# Patient Record
Sex: FEMALE | Race: WHITE | NOT HISPANIC OR LATINO
[De-identification: names, ages, dates, MRNs, and addresses within clinical notes are randomized per-mention and may not be internally consistent; named-entity substitution may affect disease eponyms.]

---

## 2017-06-20 ENCOUNTER — APPOINTMENT (OUTPATIENT)
Dept: OBGYN | Facility: CLINIC | Age: 57
End: 2017-06-20

## 2017-06-20 ENCOUNTER — OUTPATIENT (OUTPATIENT)
Dept: OUTPATIENT SERVICES | Facility: HOSPITAL | Age: 57
LOS: 1 days | End: 2017-06-20
Payer: MEDICARE

## 2017-06-20 ENCOUNTER — RESULT REVIEW (OUTPATIENT)
Age: 57
End: 2017-06-20

## 2017-06-20 VITALS
RESPIRATION RATE: 19 BRPM | DIASTOLIC BLOOD PRESSURE: 79 MMHG | TEMPERATURE: 99.3 F | SYSTOLIC BLOOD PRESSURE: 127 MMHG | OXYGEN SATURATION: 98 % | HEART RATE: 76 BPM | HEIGHT: 68 IN | BODY MASS INDEX: 44.41 KG/M2 | WEIGHT: 293 LBS

## 2017-06-20 DIAGNOSIS — Z00.00 ENCOUNTER FOR GENERAL ADULT MEDICAL EXAMINATION WITHOUT ABNORMAL FINDINGS: ICD-10-CM

## 2017-06-20 DIAGNOSIS — Z01.419 ENCOUNTER FOR GYNECOLOGICAL EXAMINATION (GENERAL) (ROUTINE) W/OUT ABNORMAL FINDINGS: ICD-10-CM

## 2017-06-20 DIAGNOSIS — Z87.09 PERSONAL HISTORY OF OTHER DISEASES OF THE RESPIRATORY SYSTEM: ICD-10-CM

## 2017-06-20 DIAGNOSIS — Z86.018 PERSONAL HISTORY OF OTHER BENIGN NEOPLASM: ICD-10-CM

## 2017-06-20 DIAGNOSIS — D25.9 LEIOMYOMA OF UTERUS, UNSPECIFIED: ICD-10-CM

## 2017-06-20 PROCEDURE — G0463: CPT

## 2017-06-20 PROCEDURE — 88175 CYTOPATH C/V AUTO FLUID REDO: CPT

## 2017-06-20 PROCEDURE — G0101: CPT

## 2017-06-20 PROCEDURE — 87624 HPV HI-RISK TYP POOLED RSLT: CPT

## 2017-06-20 RX ORDER — HYDROCHLOROTHIAZIDE 25 MG/1
25 TABLET ORAL
Qty: 30 | Refills: 0 | Status: ACTIVE | COMMUNITY
Start: 2017-06-09

## 2017-06-20 RX ORDER — METOPROLOL TARTRATE 25 MG/1
25 TABLET, FILM COATED ORAL
Qty: 120 | Refills: 0 | Status: ACTIVE | COMMUNITY
Start: 2017-06-09

## 2017-06-22 DIAGNOSIS — Z01.419 ENCOUNTER FOR GYNECOLOGICAL EXAMINATION (GENERAL) (ROUTINE) WITHOUT ABNORMAL FINDINGS: ICD-10-CM

## 2017-06-22 DIAGNOSIS — D25.9 LEIOMYOMA OF UTERUS, UNSPECIFIED: ICD-10-CM

## 2017-06-23 LAB
C TRACH RRNA SPEC QL NAA+PROBE: SIGNIFICANT CHANGE UP
N GONORRHOEA RRNA SPEC QL NAA+PROBE: SIGNIFICANT CHANGE UP
SPECIMEN SOURCE: SIGNIFICANT CHANGE UP

## 2017-06-27 ENCOUNTER — APPOINTMENT (OUTPATIENT)
Dept: CARDIOLOGY | Facility: CLINIC | Age: 57
End: 2017-06-27

## 2017-06-27 ENCOUNTER — OUTPATIENT (OUTPATIENT)
Dept: OUTPATIENT SERVICES | Facility: HOSPITAL | Age: 57
LOS: 1 days | End: 2017-06-27
Payer: MEDICARE

## 2017-06-27 VITALS
BODY MASS INDEX: 44.41 KG/M2 | HEIGHT: 68 IN | SYSTOLIC BLOOD PRESSURE: 123 MMHG | RESPIRATION RATE: 18 BRPM | HEART RATE: 78 BPM | DIASTOLIC BLOOD PRESSURE: 85 MMHG | OXYGEN SATURATION: 95 % | WEIGHT: 293 LBS

## 2017-06-27 DIAGNOSIS — Z00.8 ENCOUNTER FOR OTHER GENERAL EXAMINATION: ICD-10-CM

## 2017-06-27 PROCEDURE — 93005 ELECTROCARDIOGRAM TRACING: CPT

## 2017-06-27 PROCEDURE — G0463: CPT | Mod: 25

## 2017-06-28 DIAGNOSIS — R07.9 CHEST PAIN, UNSPECIFIED: ICD-10-CM

## 2017-07-25 ENCOUNTER — OUTPATIENT (OUTPATIENT)
Dept: OUTPATIENT SERVICES | Facility: HOSPITAL | Age: 57
LOS: 1 days | End: 2017-07-25
Payer: MEDICARE

## 2017-07-25 ENCOUNTER — APPOINTMENT (OUTPATIENT)
Dept: CARDIOLOGY | Facility: CLINIC | Age: 57
End: 2017-07-25

## 2017-07-25 DIAGNOSIS — Z00.8 ENCOUNTER FOR OTHER GENERAL EXAMINATION: ICD-10-CM

## 2017-07-25 PROCEDURE — 93306 TTE W/DOPPLER COMPLETE: CPT | Mod: 26

## 2017-07-25 PROCEDURE — 93306 TTE W/DOPPLER COMPLETE: CPT

## 2017-07-31 ENCOUNTER — APPOINTMENT (OUTPATIENT)
Dept: ULTRASOUND IMAGING | Facility: HOSPITAL | Age: 57
End: 2017-07-31
Payer: MEDICARE

## 2017-07-31 ENCOUNTER — OUTPATIENT (OUTPATIENT)
Dept: OUTPATIENT SERVICES | Facility: HOSPITAL | Age: 57
LOS: 1 days | End: 2017-07-31
Payer: MEDICARE

## 2017-07-31 DIAGNOSIS — D25.9 LEIOMYOMA OF UTERUS, UNSPECIFIED: ICD-10-CM

## 2017-07-31 PROCEDURE — 76830 TRANSVAGINAL US NON-OB: CPT | Mod: 26

## 2017-07-31 PROCEDURE — 76830 TRANSVAGINAL US NON-OB: CPT

## 2017-07-31 PROCEDURE — 76856 US EXAM PELVIC COMPLETE: CPT | Mod: 26

## 2017-07-31 PROCEDURE — 76856 US EXAM PELVIC COMPLETE: CPT

## 2017-08-01 ENCOUNTER — APPOINTMENT (OUTPATIENT)
Dept: CARDIOLOGY | Facility: CLINIC | Age: 57
End: 2017-08-01

## 2017-08-08 ENCOUNTER — APPOINTMENT (OUTPATIENT)
Dept: CARDIOLOGY | Facility: CLINIC | Age: 57
End: 2017-08-08

## 2017-08-08 ENCOUNTER — OUTPATIENT (OUTPATIENT)
Dept: OUTPATIENT SERVICES | Facility: HOSPITAL | Age: 57
LOS: 1 days | End: 2017-08-08

## 2017-08-08 DIAGNOSIS — Z00.8 ENCOUNTER FOR OTHER GENERAL EXAMINATION: ICD-10-CM

## 2017-10-10 ENCOUNTER — APPOINTMENT (OUTPATIENT)
Dept: OBGYN | Facility: CLINIC | Age: 57
End: 2017-10-10
Payer: MEDICARE

## 2017-10-10 ENCOUNTER — OUTPATIENT (OUTPATIENT)
Dept: OUTPATIENT SERVICES | Facility: HOSPITAL | Age: 57
LOS: 1 days | End: 2017-10-10
Payer: MEDICARE

## 2017-10-10 ENCOUNTER — APPOINTMENT (OUTPATIENT)
Dept: CARDIOLOGY | Facility: CLINIC | Age: 57
End: 2017-10-10
Payer: MEDICARE

## 2017-10-10 VITALS
HEART RATE: 93 BPM | BODY MASS INDEX: 44.41 KG/M2 | DIASTOLIC BLOOD PRESSURE: 77 MMHG | WEIGHT: 293 LBS | SYSTOLIC BLOOD PRESSURE: 112 MMHG | HEIGHT: 68 IN | TEMPERATURE: 97.9 F

## 2017-10-10 DIAGNOSIS — Z00.00 ENCOUNTER FOR GENERAL ADULT MEDICAL EXAMINATION WITHOUT ABNORMAL FINDINGS: ICD-10-CM

## 2017-10-10 DIAGNOSIS — D25.2 INTRAMURAL LEIOMYOMA OF UTERUS: ICD-10-CM

## 2017-10-10 DIAGNOSIS — R93.8 ABNORMAL FINDINGS ON DIAGNOSTIC IMAGING OF OTHER SPECIFIED BODY STRUCTURES: ICD-10-CM

## 2017-10-10 DIAGNOSIS — Z00.8 ENCOUNTER FOR OTHER GENERAL EXAMINATION: ICD-10-CM

## 2017-10-10 DIAGNOSIS — D25.1 INTRAMURAL LEIOMYOMA OF UTERUS: ICD-10-CM

## 2017-10-10 PROCEDURE — G0463: CPT

## 2017-10-10 PROCEDURE — ZZZZZ: CPT

## 2017-10-10 PROCEDURE — 99213 OFFICE O/P EST LOW 20 MIN: CPT

## 2017-10-11 DIAGNOSIS — I10 ESSENTIAL (PRIMARY) HYPERTENSION: ICD-10-CM

## 2017-10-13 DIAGNOSIS — R93.8 ABNORMAL FINDINGS ON DIAGNOSTIC IMAGING OF OTHER SPECIFIED BODY STRUCTURES: ICD-10-CM

## 2017-10-13 DIAGNOSIS — D25.1 INTRAMURAL LEIOMYOMA OF UTERUS: ICD-10-CM

## 2018-02-04 ENCOUNTER — EMERGENCY (EMERGENCY)
Facility: HOSPITAL | Age: 58
LOS: 1 days | Discharge: HOME | End: 2018-02-04
Attending: EMERGENCY MEDICINE | Admitting: EMERGENCY MEDICINE
Payer: MEDICARE

## 2018-02-04 VITALS
OXYGEN SATURATION: 95 % | SYSTOLIC BLOOD PRESSURE: 135 MMHG | RESPIRATION RATE: 18 BRPM | DIASTOLIC BLOOD PRESSURE: 82 MMHG | TEMPERATURE: 98 F | HEART RATE: 82 BPM

## 2018-02-04 VITALS
TEMPERATURE: 100 F | HEART RATE: 102 BPM | SYSTOLIC BLOOD PRESSURE: 140 MMHG | RESPIRATION RATE: 20 BRPM | OXYGEN SATURATION: 95 % | DIASTOLIC BLOOD PRESSURE: 83 MMHG

## 2018-02-04 LAB
ALBUMIN SERPL ELPH-MCNC: 3.9 G/DL — SIGNIFICANT CHANGE UP (ref 3.3–5)
ALP SERPL-CCNC: 93 U/L — SIGNIFICANT CHANGE UP (ref 40–120)
ALT FLD-CCNC: 19 U/L RC — SIGNIFICANT CHANGE UP (ref 10–45)
ANION GAP SERPL CALC-SCNC: 11 MMOL/L — SIGNIFICANT CHANGE UP (ref 5–17)
APPEARANCE UR: CLEAR — SIGNIFICANT CHANGE UP
AST SERPL-CCNC: 24 U/L — SIGNIFICANT CHANGE UP (ref 10–40)
BACTERIA # UR AUTO: ABNORMAL /HPF
BASOPHILS # BLD AUTO: 0 K/UL — SIGNIFICANT CHANGE UP (ref 0–0.2)
BASOPHILS NFR BLD AUTO: 0.3 % — SIGNIFICANT CHANGE UP (ref 0–2)
BILIRUB SERPL-MCNC: 0.5 MG/DL — SIGNIFICANT CHANGE UP (ref 0.2–1.2)
BILIRUB UR-MCNC: NEGATIVE — SIGNIFICANT CHANGE UP
BUN SERPL-MCNC: 21 MG/DL — SIGNIFICANT CHANGE UP (ref 7–23)
CALCIUM SERPL-MCNC: 9.6 MG/DL — SIGNIFICANT CHANGE UP (ref 8.4–10.5)
CHLORIDE SERPL-SCNC: 104 MMOL/L — SIGNIFICANT CHANGE UP (ref 96–108)
CK MB BLD-MCNC: 2.5 % — SIGNIFICANT CHANGE UP (ref 0–3.5)
CK MB CFR SERPL CALC: 3.5 NG/ML — SIGNIFICANT CHANGE UP (ref 0–3.8)
CK SERPL-CCNC: 140 U/L — SIGNIFICANT CHANGE UP (ref 25–170)
CO2 SERPL-SCNC: 26 MMOL/L — SIGNIFICANT CHANGE UP (ref 22–31)
COLOR SPEC: YELLOW — SIGNIFICANT CHANGE UP
CREAT SERPL-MCNC: 1.02 MG/DL — SIGNIFICANT CHANGE UP (ref 0.5–1.3)
DIFF PNL FLD: ABNORMAL
EOSINOPHIL # BLD AUTO: 0.2 K/UL — SIGNIFICANT CHANGE UP (ref 0–0.5)
EOSINOPHIL NFR BLD AUTO: 2.1 % — SIGNIFICANT CHANGE UP (ref 0–6)
EPI CELLS # UR: SIGNIFICANT CHANGE UP /HPF
GLUCOSE SERPL-MCNC: 95 MG/DL — SIGNIFICANT CHANGE UP (ref 70–99)
GLUCOSE UR QL: NEGATIVE — SIGNIFICANT CHANGE UP
HCT VFR BLD CALC: 48.7 % — HIGH (ref 34.5–45)
HGB BLD-MCNC: 15.4 G/DL — SIGNIFICANT CHANGE UP (ref 11.5–15.5)
KETONES UR-MCNC: NEGATIVE — SIGNIFICANT CHANGE UP
LEUKOCYTE ESTERASE UR-ACNC: ABNORMAL
LIDOCAIN IGE QN: 21 U/L — SIGNIFICANT CHANGE UP (ref 7–60)
LYMPHOCYTES # BLD AUTO: 1.3 K/UL — SIGNIFICANT CHANGE UP (ref 1–3.3)
LYMPHOCYTES # BLD AUTO: 14.8 % — SIGNIFICANT CHANGE UP (ref 13–44)
MAGNESIUM SERPL-MCNC: 1.9 MG/DL — SIGNIFICANT CHANGE UP (ref 1.6–2.6)
MCHC RBC-ENTMCNC: 25.7 PG — LOW (ref 27–34)
MCHC RBC-ENTMCNC: 31.7 GM/DL — LOW (ref 32–36)
MCV RBC AUTO: 81.1 FL — SIGNIFICANT CHANGE UP (ref 80–100)
MONOCYTES # BLD AUTO: 0.6 K/UL — SIGNIFICANT CHANGE UP (ref 0–0.9)
MONOCYTES NFR BLD AUTO: 6.6 % — SIGNIFICANT CHANGE UP (ref 2–14)
NEUTROPHILS # BLD AUTO: 6.5 K/UL — SIGNIFICANT CHANGE UP (ref 1.8–7.4)
NEUTROPHILS NFR BLD AUTO: 76.3 % — SIGNIFICANT CHANGE UP (ref 43–77)
NITRITE UR-MCNC: NEGATIVE — SIGNIFICANT CHANGE UP
PH UR: 5.5 — SIGNIFICANT CHANGE UP (ref 5–8)
PHOSPHATE SERPL-MCNC: 2.7 MG/DL — SIGNIFICANT CHANGE UP (ref 2.5–4.5)
PLATELET # BLD AUTO: 297 K/UL — SIGNIFICANT CHANGE UP (ref 150–400)
POTASSIUM SERPL-MCNC: 3.8 MMOL/L — SIGNIFICANT CHANGE UP (ref 3.5–5.3)
POTASSIUM SERPL-SCNC: 3.8 MMOL/L — SIGNIFICANT CHANGE UP (ref 3.5–5.3)
PROT SERPL-MCNC: 7.6 G/DL — SIGNIFICANT CHANGE UP (ref 6–8.3)
PROT UR-MCNC: SIGNIFICANT CHANGE UP
RBC # BLD: 6 M/UL — HIGH (ref 3.8–5.2)
RBC # FLD: 13 % — SIGNIFICANT CHANGE UP (ref 10.3–14.5)
RBC CASTS # UR COMP ASSIST: ABNORMAL /HPF (ref 0–2)
SODIUM SERPL-SCNC: 141 MMOL/L — SIGNIFICANT CHANGE UP (ref 135–145)
SP GR SPEC: 1.03 — HIGH (ref 1.01–1.02)
TROPONIN T SERPL-MCNC: <0.01 NG/ML — SIGNIFICANT CHANGE UP (ref 0–0.06)
UROBILINOGEN FLD QL: NEGATIVE — SIGNIFICANT CHANGE UP
WBC # BLD: 8.6 K/UL — SIGNIFICANT CHANGE UP (ref 3.8–10.5)
WBC # FLD AUTO: 8.6 K/UL — SIGNIFICANT CHANGE UP (ref 3.8–10.5)
WBC UR QL: SIGNIFICANT CHANGE UP /HPF (ref 0–5)

## 2018-02-04 PROCEDURE — 84100 ASSAY OF PHOSPHORUS: CPT

## 2018-02-04 PROCEDURE — 84484 ASSAY OF TROPONIN QUANT: CPT

## 2018-02-04 PROCEDURE — 82550 ASSAY OF CK (CPK): CPT

## 2018-02-04 PROCEDURE — 80053 COMPREHEN METABOLIC PANEL: CPT

## 2018-02-04 PROCEDURE — 93010 ELECTROCARDIOGRAM REPORT: CPT

## 2018-02-04 PROCEDURE — 83690 ASSAY OF LIPASE: CPT

## 2018-02-04 PROCEDURE — 99285 EMERGENCY DEPT VISIT HI MDM: CPT | Mod: 25,GC

## 2018-02-04 PROCEDURE — 83735 ASSAY OF MAGNESIUM: CPT

## 2018-02-04 PROCEDURE — 82553 CREATINE MB FRACTION: CPT

## 2018-02-04 PROCEDURE — 85027 COMPLETE CBC AUTOMATED: CPT

## 2018-02-04 PROCEDURE — 99283 EMERGENCY DEPT VISIT LOW MDM: CPT | Mod: 25

## 2018-02-04 PROCEDURE — 81001 URINALYSIS AUTO W/SCOPE: CPT

## 2018-02-04 PROCEDURE — 93005 ELECTROCARDIOGRAM TRACING: CPT

## 2018-02-04 NOTE — ED PROVIDER NOTE - PROGRESS NOTE DETAILS
Repeat BMP shows normal renal function, normal K 3.8, other electrolytes within normal limits. No evidence of cardiac events or evidence of rhabdo. Urinalysis shows trace hematuria and leukocyte esterase, but patient denies urinary symptoms, low suspicion for UTI. Patient agreeable with discharge to follow up with PMD. Instructed to hold ACEi until she meets up with PMD again.

## 2018-02-04 NOTE — ED PROVIDER NOTE - CARE PLAN
Principal Discharge DX:	Hyperkalemia  Assessment and plan of treatment:	You came because of an elevated potassium found on outside labs; when we repeated your potassium here, the levels were within normal limits (at 3.8). The rest of your studies were also largely within normal limits. Your EKG here in the emergency room was within normal limits, so you did not require any urgent therapy to treat for hyperkalemia. Your urinalysis shows a low grade amount of inflammation, but you had no symptoms of a urinary tract infection; please follow up with your primary doctor if you develop any symptoms of burning with urination, incontinence, or urinary urgency.    Please follow up with your primary care physician this week to discuss further work up; we suspect the elevated potassium might have been a lab error caused by hemolysis. Please do not restart taking your lisinopril until you meet with your primary care physician. Principal Discharge DX:	Fatigue  Assessment and plan of treatment:	You came because of an elevated potassium found on outside labs; when we repeated your potassium here, the levels were within normal limits (at 3.8). The rest of your studies were also largely within normal limits. Your EKG here in the emergency room was within normal limits, so you did not require any urgent therapy to treat for hyperkalemia. Your urinalysis shows a low grade amount of inflammation, but you had no symptoms of a urinary tract infection; please follow up with your primary doctor if you develop any symptoms of burning with urination, incontinence, or urinary urgency.    Please follow up with your primary care physician this week to discuss further work up; we suspect the elevated potassium might have been a lab error caused by hemolysis. Please do not restart taking your lisinopril until you meet with your primary care physician.

## 2018-02-04 NOTE — ED PROVIDER NOTE - OBJECTIVE STATEMENT
58 yo F w/ hx HTN (on HCTZ, lisinopril), intermittent, well controlled asthma, morbid obesity, presenting from home after being called about a high potassium level (6.1) that was drawn on Friday 2/2/18. Patient reports that she has been having palpitations, mild fatigue for 1 week, went to PMD on Friday for lab work. No previous hx of renal disease or dysfunction based on labs in system. Denies any fevers or chills; endorses some lower extremity myalgias, but no muscle tenderness. Palpitations come and go, but denies chest pain. Denies any shortness of breath; asthma well controlled with rescue inhaler (uses only intermittently). Denies any previous hx of hyperkalemia. No recent changes to diet; denies any dysuria, hematuria, or changes in urine color (yellow, clear).

## 2018-02-04 NOTE — ED ADULT NURSE NOTE - OBJECTIVE STATEMENT
Pt presents to ED with complaint of dizzyness , palpitations, and fatigue. Pt AXOX3. Skins color appears normal for race and age. Pt reports she saw Primary care on friday for fatigue and palpitations and had labs drawn, was called by office today and told to come to ED for elevated potassium -states K was 6.1. Reports intermittent dyspnea on exertions, no shortness of breath reported at this time. Breathing unlabored and symmetrical. No chest pain. Pt reports "soreness" to lower extremities. Pt denies dysuria and hematuria, Nausea reported , no vomiting or diarrhea. Abdomen soft and nontender.

## 2018-02-04 NOTE — ED PROVIDER NOTE - PLAN OF CARE
You came because of an elevated potassium found on outside labs; when we repeated your potassium here, the levels were within normal limits (at 3.8). The rest of your studies were also largely within normal limits. Your EKG here in the emergency room was within normal limits, so you did not require any urgent therapy to treat for hyperkalemia. Your urinalysis shows a low grade amount of inflammation, but you had no symptoms of a urinary tract infection; please follow up with your primary doctor if you develop any symptoms of burning with urination, incontinence, or urinary urgency.    Please follow up with your primary care physician this week to discuss further work up; we suspect the elevated potassium might have been a lab error caused by hemolysis. Please do not restart taking your lisinopril until you meet with your primary care physician.

## 2018-03-20 ENCOUNTER — APPOINTMENT (OUTPATIENT)
Dept: CARDIOLOGY | Facility: CLINIC | Age: 58
End: 2018-03-20

## 2018-07-10 ENCOUNTER — APPOINTMENT (OUTPATIENT)
Dept: OBGYN | Facility: CLINIC | Age: 58
End: 2018-07-10

## 2019-07-10 ENCOUNTER — APPOINTMENT (OUTPATIENT)
Dept: OBGYN | Facility: CLINIC | Age: 59
End: 2019-07-10

## 2019-12-11 ENCOUNTER — APPOINTMENT (OUTPATIENT)
Dept: OBGYN | Facility: CLINIC | Age: 59
End: 2019-12-11

## 2020-10-21 ENCOUNTER — INPATIENT (INPATIENT)
Facility: HOSPITAL | Age: 60
LOS: 3 days | Discharge: HOME | DRG: 312 | End: 2020-10-25
Attending: INTERNAL MEDICINE | Admitting: INTERNAL MEDICINE
Payer: MEDICARE

## 2020-10-21 VITALS
RESPIRATION RATE: 20 BRPM | HEIGHT: 70 IN | HEART RATE: 76 BPM | WEIGHT: 293 LBS | TEMPERATURE: 98 F | OXYGEN SATURATION: 97 % | SYSTOLIC BLOOD PRESSURE: 119 MMHG | DIASTOLIC BLOOD PRESSURE: 78 MMHG

## 2020-10-21 DIAGNOSIS — J45.909 UNSPECIFIED ASTHMA, UNCOMPLICATED: ICD-10-CM

## 2020-10-21 DIAGNOSIS — R53.1 WEAKNESS: ICD-10-CM

## 2020-10-21 DIAGNOSIS — R11.0 NAUSEA: ICD-10-CM

## 2020-10-21 DIAGNOSIS — R10.9 UNSPECIFIED ABDOMINAL PAIN: ICD-10-CM

## 2020-10-21 DIAGNOSIS — I10 ESSENTIAL (PRIMARY) HYPERTENSION: ICD-10-CM

## 2020-10-21 LAB
ALBUMIN SERPL ELPH-MCNC: 3.9 G/DL — SIGNIFICANT CHANGE UP (ref 3.3–5)
ALP SERPL-CCNC: 87 U/L — SIGNIFICANT CHANGE UP (ref 40–120)
ALT FLD-CCNC: 19 U/L — SIGNIFICANT CHANGE UP (ref 10–45)
ANION GAP SERPL CALC-SCNC: 12 MMOL/L — SIGNIFICANT CHANGE UP (ref 5–17)
APPEARANCE UR: CLEAR — SIGNIFICANT CHANGE UP
APTT BLD: 37.5 SEC — HIGH (ref 27.5–35.5)
AST SERPL-CCNC: 24 U/L — SIGNIFICANT CHANGE UP (ref 10–40)
BACTERIA # UR AUTO: NEGATIVE — SIGNIFICANT CHANGE UP
BASE EXCESS BLDV CALC-SCNC: 2.7 MMOL/L — HIGH (ref -2–2)
BASOPHILS # BLD AUTO: 0.04 K/UL — SIGNIFICANT CHANGE UP (ref 0–0.2)
BASOPHILS NFR BLD AUTO: 0.6 % — SIGNIFICANT CHANGE UP (ref 0–2)
BILIRUB SERPL-MCNC: 0.5 MG/DL — SIGNIFICANT CHANGE UP (ref 0.2–1.2)
BILIRUB UR-MCNC: NEGATIVE — SIGNIFICANT CHANGE UP
BLD GP AB SCN SERPL QL: NEGATIVE — SIGNIFICANT CHANGE UP
BUN SERPL-MCNC: 18 MG/DL — SIGNIFICANT CHANGE UP (ref 7–23)
CA-I SERPL-SCNC: 1.19 MMOL/L — SIGNIFICANT CHANGE UP (ref 1.12–1.3)
CALCIUM SERPL-MCNC: 9.6 MG/DL — SIGNIFICANT CHANGE UP (ref 8.4–10.5)
CHLORIDE BLDV-SCNC: 108 MMOL/L — SIGNIFICANT CHANGE UP (ref 96–108)
CHLORIDE SERPL-SCNC: 105 MMOL/L — SIGNIFICANT CHANGE UP (ref 96–108)
CO2 BLDV-SCNC: 31 MMOL/L — HIGH (ref 22–30)
CO2 SERPL-SCNC: 25 MMOL/L — SIGNIFICANT CHANGE UP (ref 22–31)
COLOR SPEC: YELLOW — SIGNIFICANT CHANGE UP
CREAT SERPL-MCNC: 0.99 MG/DL — SIGNIFICANT CHANGE UP (ref 0.5–1.3)
DIFF PNL FLD: ABNORMAL
EOSINOPHIL # BLD AUTO: 0.14 K/UL — SIGNIFICANT CHANGE UP (ref 0–0.5)
EOSINOPHIL NFR BLD AUTO: 2.2 % — SIGNIFICANT CHANGE UP (ref 0–6)
EPI CELLS # UR: 2 /HPF — SIGNIFICANT CHANGE UP
GAS PNL BLDV: 140 MMOL/L — SIGNIFICANT CHANGE UP (ref 135–145)
GAS PNL BLDV: SIGNIFICANT CHANGE UP
GAS PNL BLDV: SIGNIFICANT CHANGE UP
GLUCOSE BLDV-MCNC: 97 MG/DL — SIGNIFICANT CHANGE UP (ref 70–99)
GLUCOSE SERPL-MCNC: 97 MG/DL — SIGNIFICANT CHANGE UP (ref 70–99)
GLUCOSE UR QL: NEGATIVE — SIGNIFICANT CHANGE UP
HCO3 BLDV-SCNC: 29 MMOL/L — SIGNIFICANT CHANGE UP (ref 21–29)
HCT VFR BLD CALC: 52.1 % — HIGH (ref 34.5–45)
HCT VFR BLDA CALC: 51 % — HIGH (ref 39–50)
HGB BLD CALC-MCNC: 16.7 G/DL — HIGH (ref 11.5–15.5)
HGB BLD-MCNC: 16.1 G/DL — HIGH (ref 11.5–15.5)
HIV 1 & 2 AB SERPL IA.RAPID: SIGNIFICANT CHANGE UP
HOROWITZ INDEX BLDV+IHG-RTO: SIGNIFICANT CHANGE UP
HYALINE CASTS # UR AUTO: 0 /LPF — SIGNIFICANT CHANGE UP (ref 0–2)
IMM GRANULOCYTES NFR BLD AUTO: 0.3 % — SIGNIFICANT CHANGE UP (ref 0–1.5)
INR BLD: 1.14 RATIO — SIGNIFICANT CHANGE UP (ref 0.88–1.16)
KETONES UR-MCNC: NEGATIVE — SIGNIFICANT CHANGE UP
LACTATE BLDV-MCNC: 1.6 MMOL/L — SIGNIFICANT CHANGE UP (ref 0.7–2)
LEUKOCYTE ESTERASE UR-ACNC: NEGATIVE — SIGNIFICANT CHANGE UP
LIDOCAIN IGE QN: 18 U/L — SIGNIFICANT CHANGE UP (ref 7–60)
LYMPHOCYTES # BLD AUTO: 0.96 K/UL — LOW (ref 1–3.3)
LYMPHOCYTES # BLD AUTO: 15.3 % — SIGNIFICANT CHANGE UP (ref 13–44)
MCHC RBC-ENTMCNC: 24.6 PG — LOW (ref 27–34)
MCHC RBC-ENTMCNC: 30.9 GM/DL — LOW (ref 32–36)
MCV RBC AUTO: 79.5 FL — LOW (ref 80–100)
MONOCYTES # BLD AUTO: 0.73 K/UL — SIGNIFICANT CHANGE UP (ref 0–0.9)
MONOCYTES NFR BLD AUTO: 11.6 % — SIGNIFICANT CHANGE UP (ref 2–14)
NEUTROPHILS # BLD AUTO: 4.39 K/UL — SIGNIFICANT CHANGE UP (ref 1.8–7.4)
NEUTROPHILS NFR BLD AUTO: 70 % — SIGNIFICANT CHANGE UP (ref 43–77)
NITRITE UR-MCNC: NEGATIVE — SIGNIFICANT CHANGE UP
NRBC # BLD: 0 /100 WBCS — SIGNIFICANT CHANGE UP (ref 0–0)
OB PNL STL: NEGATIVE — SIGNIFICANT CHANGE UP
PCO2 BLDV: 52 MMHG — HIGH (ref 35–50)
PH BLDV: 7.36 — SIGNIFICANT CHANGE UP (ref 7.35–7.45)
PH UR: 5.5 — SIGNIFICANT CHANGE UP (ref 5–8)
PLATELET # BLD AUTO: 315 K/UL — SIGNIFICANT CHANGE UP (ref 150–400)
PO2 BLDV: 26 MMHG — SIGNIFICANT CHANGE UP (ref 25–45)
POTASSIUM BLDV-SCNC: 3.5 MMOL/L — SIGNIFICANT CHANGE UP (ref 3.5–5.3)
POTASSIUM SERPL-MCNC: 3.6 MMOL/L — SIGNIFICANT CHANGE UP (ref 3.5–5.3)
POTASSIUM SERPL-SCNC: 3.6 MMOL/L — SIGNIFICANT CHANGE UP (ref 3.5–5.3)
PROT SERPL-MCNC: 7.4 G/DL — SIGNIFICANT CHANGE UP (ref 6–8.3)
PROT UR-MCNC: SIGNIFICANT CHANGE UP
PROTHROM AB SERPL-ACNC: 13.6 SEC — SIGNIFICANT CHANGE UP (ref 10.6–13.6)
RBC # BLD: 6.55 M/UL — HIGH (ref 3.8–5.2)
RBC # FLD: 16.6 % — HIGH (ref 10.3–14.5)
RBC CASTS # UR COMP ASSIST: 5 /HPF — HIGH (ref 0–4)
RH IG SCN BLD-IMP: POSITIVE — SIGNIFICANT CHANGE UP
RH IG SCN BLD-IMP: POSITIVE — SIGNIFICANT CHANGE UP
SAO2 % BLDV: 46 % — LOW (ref 67–88)
SARS-COV-2 RNA SPEC QL NAA+PROBE: SIGNIFICANT CHANGE UP
SODIUM SERPL-SCNC: 142 MMOL/L — SIGNIFICANT CHANGE UP (ref 135–145)
SP GR SPEC: 1.04 — HIGH (ref 1.01–1.02)
TROPONIN T, HIGH SENSITIVITY RESULT: 12 NG/L — SIGNIFICANT CHANGE UP (ref 0–51)
UROBILINOGEN FLD QL: NEGATIVE — SIGNIFICANT CHANGE UP
WBC # BLD: 6.28 K/UL — SIGNIFICANT CHANGE UP (ref 3.8–10.5)
WBC # FLD AUTO: 6.28 K/UL — SIGNIFICANT CHANGE UP (ref 3.8–10.5)
WBC UR QL: 3 /HPF — SIGNIFICANT CHANGE UP (ref 0–5)

## 2020-10-21 PROCEDURE — 99285 EMERGENCY DEPT VISIT HI MDM: CPT | Mod: CS

## 2020-10-21 PROCEDURE — 74177 CT ABD & PELVIS W/CONTRAST: CPT | Mod: 26

## 2020-10-21 PROCEDURE — 71045 X-RAY EXAM CHEST 1 VIEW: CPT | Mod: 26

## 2020-10-21 PROCEDURE — 93010 ELECTROCARDIOGRAM REPORT: CPT

## 2020-10-21 RX ORDER — KETOROLAC TROMETHAMINE 30 MG/ML
15 SYRINGE (ML) INJECTION ONCE
Refills: 0 | Status: DISCONTINUED | OUTPATIENT
Start: 2020-10-21 | End: 2020-10-21

## 2020-10-21 RX ORDER — ALBUTEROL 90 UG/1
0 AEROSOL, METERED ORAL
Qty: 0 | Refills: 0 | DISCHARGE

## 2020-10-21 RX ORDER — METOPROLOL TARTRATE 50 MG
25 TABLET ORAL
Refills: 0 | Status: DISCONTINUED | OUTPATIENT
Start: 2020-10-21 | End: 2020-10-23

## 2020-10-21 RX ORDER — HYDROCHLOROTHIAZIDE 25 MG
25 TABLET ORAL DAILY
Refills: 0 | Status: DISCONTINUED | OUTPATIENT
Start: 2020-10-21 | End: 2020-10-22

## 2020-10-21 RX ORDER — METOCLOPRAMIDE HCL 10 MG
5 TABLET ORAL ONCE
Refills: 0 | Status: COMPLETED | OUTPATIENT
Start: 2020-10-21 | End: 2020-10-22

## 2020-10-21 RX ORDER — SUCRALFATE 1 G
1 TABLET ORAL
Refills: 0 | Status: DISCONTINUED | OUTPATIENT
Start: 2020-10-21 | End: 2020-10-25

## 2020-10-21 RX ORDER — LISINOPRIL 2.5 MG/1
20 TABLET ORAL DAILY
Refills: 0 | Status: DISCONTINUED | OUTPATIENT
Start: 2020-10-21 | End: 2020-10-23

## 2020-10-21 RX ORDER — ONDANSETRON 8 MG/1
4 TABLET, FILM COATED ORAL ONCE
Refills: 0 | Status: COMPLETED | OUTPATIENT
Start: 2020-10-21 | End: 2020-10-21

## 2020-10-21 RX ORDER — PANTOPRAZOLE SODIUM 20 MG/1
40 TABLET, DELAYED RELEASE ORAL DAILY
Refills: 0 | Status: DISCONTINUED | OUTPATIENT
Start: 2020-10-21 | End: 2020-10-25

## 2020-10-21 RX ORDER — ALBUTEROL 90 UG/1
2 AEROSOL, METERED ORAL EVERY 6 HOURS
Refills: 0 | Status: DISCONTINUED | OUTPATIENT
Start: 2020-10-21 | End: 2020-10-25

## 2020-10-21 RX ORDER — ONDANSETRON 8 MG/1
4 TABLET, FILM COATED ORAL EVERY 6 HOURS
Refills: 0 | Status: DISCONTINUED | OUTPATIENT
Start: 2020-10-21 | End: 2020-10-25

## 2020-10-21 RX ORDER — SODIUM CHLORIDE 9 MG/ML
500 INJECTION INTRAMUSCULAR; INTRAVENOUS; SUBCUTANEOUS ONCE
Refills: 0 | Status: COMPLETED | OUTPATIENT
Start: 2020-10-21 | End: 2020-10-21

## 2020-10-21 RX ORDER — INFLUENZA VIRUS VACCINE 15; 15; 15; 15 UG/.5ML; UG/.5ML; UG/.5ML; UG/.5ML
0.5 SUSPENSION INTRAMUSCULAR ONCE
Refills: 0 | Status: DISCONTINUED | OUTPATIENT
Start: 2020-10-21 | End: 2020-10-25

## 2020-10-21 RX ORDER — SIMETHICONE 80 MG/1
80 TABLET, CHEWABLE ORAL THREE TIMES A DAY
Refills: 0 | Status: DISCONTINUED | OUTPATIENT
Start: 2020-10-21 | End: 2020-10-25

## 2020-10-21 RX ORDER — AMLODIPINE BESYLATE 2.5 MG/1
5 TABLET ORAL DAILY
Refills: 0 | Status: DISCONTINUED | OUTPATIENT
Start: 2020-10-21 | End: 2020-10-23

## 2020-10-21 RX ORDER — SODIUM CHLORIDE 9 MG/ML
1000 INJECTION INTRAMUSCULAR; INTRAVENOUS; SUBCUTANEOUS ONCE
Refills: 0 | Status: DISCONTINUED | OUTPATIENT
Start: 2020-10-21 | End: 2020-10-21

## 2020-10-21 RX ADMIN — Medication 15 MILLIGRAM(S): at 08:35

## 2020-10-21 RX ADMIN — SIMETHICONE 80 MILLIGRAM(S): 80 TABLET, CHEWABLE ORAL at 21:55

## 2020-10-21 RX ADMIN — Medication 15 MILLIGRAM(S): at 08:05

## 2020-10-21 RX ADMIN — Medication 1 GRAM(S): at 18:22

## 2020-10-21 RX ADMIN — SODIUM CHLORIDE 500 MILLILITER(S): 9 INJECTION INTRAMUSCULAR; INTRAVENOUS; SUBCUTANEOUS at 06:34

## 2020-10-21 RX ADMIN — PANTOPRAZOLE SODIUM 40 MILLIGRAM(S): 20 TABLET, DELAYED RELEASE ORAL at 18:23

## 2020-10-21 RX ADMIN — ONDANSETRON 4 MILLIGRAM(S): 8 TABLET, FILM COATED ORAL at 20:30

## 2020-10-21 RX ADMIN — ONDANSETRON 4 MILLIGRAM(S): 8 TABLET, FILM COATED ORAL at 08:05

## 2020-10-21 RX ADMIN — SODIUM CHLORIDE 500 MILLILITER(S): 9 INJECTION INTRAMUSCULAR; INTRAVENOUS; SUBCUTANEOUS at 08:05

## 2020-10-21 NOTE — ED PROVIDER NOTE - NS ED ROS FT
Constitutional: No fever or chills  Eyes: No visual changes  CV: No chest pain or lower extremity edema  Resp: No SOB no new cough  GI: +abd cramping. +nausea No vomiting. +soft stool  : No dysuria, hematuria.   MSK: No musculoskeletal pain  Skin: No rash  Psych: No complaints   Neuro: No headache. No numbness or tingling. + weakness.

## 2020-10-21 NOTE — H&P ADULT - NSHPLABSRESULTS_GEN_ALL_CORE
Lab Results:  CBC  CBC Full  -  ( 21 Oct 2020 06:23 )  WBC Count : 6.28 K/uL  RBC Count : 6.55 M/uL  Hemoglobin : 16.1 g/dL  Hematocrit : 52.1 %  Platelet Count - Automated : 315 K/uL  Mean Cell Volume : 79.5 fl  Mean Cell Hemoglobin : 24.6 pg  Mean Cell Hemoglobin Concentration : 30.9 gm/dL  Auto Neutrophil # : 4.39 K/uL  Auto Lymphocyte # : 0.96 K/uL  Auto Monocyte # : 0.73 K/uL  Auto Eosinophil # : 0.14 K/uL  Auto Basophil # : 0.04 K/uL  Auto Neutrophil % : 70.0 %  Auto Lymphocyte % : 15.3 %  Auto Monocyte % : 11.6 %  Auto Eosinophil % : 2.2 %  Auto Basophil % : 0.6 %    .		Differential:	[] Automated		[] Manual  Chemistry                        16.1   6.28  )-----------( 315      ( 21 Oct 2020 06:23 )             52.1     10-21    142  |  105  |  18  ----------------------------<  97  3.6   |  25  |  0.99    Ca    9.6      21 Oct 2020 06:23    TPro  7.4  /  Alb  3.9  /  TBili  0.5  /  DBili  x   /  AST  24  /  ALT  19  /  AlkPhos  87  10-    LIVER FUNCTIONS - ( 21 Oct 2020 06:23 )  Alb: 3.9 g/dL / Pro: 7.4 g/dL / ALK PHOS: 87 U/L / ALT: 19 U/L / AST: 24 U/L / GGT: x           PT/INR - ( 21 Oct 2020 06:23 )   PT: 13.6 sec;   INR: 1.14 ratio         PTT - ( 21 Oct 2020 06:23 )  PTT:37.5 sec  Urinalysis Basic - ( 21 Oct 2020 09:10 )    Color: Yellow / Appearance: Clear / S.040 / pH: x  Gluc: x / Ketone: Negative  / Bili: Negative / Urobili: Negative   Blood: x / Protein: Trace / Nitrite: Negative   Leuk Esterase: Negative / RBC: 5 /hpf / WBC 3 /HPF   Sq Epi: x / Non Sq Epi: 2 /hpf / Bacteria: Negative            MICROBIOLOGY/CULTURES:      RADIOLOGY RESULTS: reviewed

## 2020-10-21 NOTE — CONSULT NOTE ADULT - SUBJECTIVE AND OBJECTIVE BOX
Chief Complaint:  Patient is a 60y old  Female who presents with a chief complaint of Presyncope (21 Oct 2020 15:10)      HPI: 59 y/o F with PMH Pre-DM, HTN, Asthma presents to ED with multiple medical complaints.   	Patient endorses total weakness for 6.5 weeks. For the last 2 weeks patient has been having of intermittent lower abdominal cramping. Endorse nausea with dry heaving for the last week. Patient endorses improvement in abdominal discomfort with flatulence. Progressively patient states that weakness has increased that she cannot physically walk around her home. For the last two days also states that she had been having bright red blood in her stool. Last BM <24 hours ago, described as "soft and mushy." Occasional coughing at baseline 2/2 asthma. Was suppose to see a GI provider but states that she was too weak to make an appointment. No prior history of upper gastrointestinal endoscopy or colonoscopy.   	No blood thinner use. Denies allergies. No chest pain, fever, urinary symptoms, vomiting.  PCP Janeth Alston ([place])    Allergies:  No Known Allergies      Medications:  influenza   Vaccine 0.5 milliLiter(s) IntraMuscular once  ondansetron Injectable 4 milliGRAM(s) IV Push every 6 hours PRN  pantoprazole    Tablet 40 milliGRAM(s) Oral daily  simethicone 80 milliGRAM(s) Chew three times a day  sucralfate suspension 1 Gram(s) Oral two times a day      PMHX/PSHX:  Uterine fibroid    MVP (mitral valve prolapse)    Asthma    Vertigo    HTN (hypertension)    S/P  section        Family history:  No pertinent family history in first degree relatives        Social History: Denies ETOH, tobacco, and illicit drug use     ROS:     General:  No wt loss, fevers, chills, night sweats, fatigue,   Eyes:  Good vision, no reported pain  ENT:  No sore throat, pain, runny nose, dysphagia  CV:  No pain, palpitations, hypo/hypertension  Resp:  No dyspnea, cough, tachypnea, wheezing  GI:  +pain, +nausea, No vomiting, No diarrhea, No constipation, No weight loss, No fever, No pruritis, No rectal bleeding, No tarry stools, No dysphagia,  :  No pain, bleeding, incontinence, nocturia  Muscle:  No pain, weakness  Neuro:  No weakness, tingling, memory problems  Psych:  No fatigue, insomnia, mood problems, depression  Endocrine:  No polyuria, polydipsia, cold/heat intolerance  Heme:  No petechiae, ecchymosis, easy bruisability  Skin:  No rash, tattoos, scars, edema      PHYSICAL EXAM:   Vital Signs:  Vital Signs Last 24 Hrs  T(C): 36.8 (21 Oct 2020 15:14), Max: 37.4 (21 Oct 2020 06:35)  T(F): 98.3 (21 Oct 2020 15:14), Max: 99.3 (21 Oct 2020 06:35)  HR: 66 (21 Oct 2020 15:14) (64 - 78)  BP: 126/77 (21 Oct 2020 15:14) (98/54 - 126/77)  BP(mean): --  RR: 18 (21 Oct 2020 15:14) (17 - 20)  SpO2: 96% (21 Oct 2020 15:14) (95% - 98%)  Daily Height in cm: 177.8 (21 Oct 2020 05:17)    Daily     GENERAL: no distress  HEENT:  NC/AT  ABDOMEN:  obese, mild ttp epigastric region + LLQ, non-distended, no guarding or rebound tenderness   EXTEREMITIES:  no cyanosis,clubbing or edema  SKIN:  No rash/erythema/ecchymoses/petechiae/wounds/abscess/warm/dry  NEURO:  Alert, oriented, no asterixis, no tremor, no encephalopathy    LABS:                        16.1   6.28  )-----------( 315      ( 21 Oct 2020 06:23 )             52.1     10-    142  |  105  |  18  ----------------------------<  97  3.6   |  25  |  0.99    Ca    9.6      21 Oct 2020 06:23    TPro  7.4  /  Alb  3.9  /  TBili  0.5  /  DBili  x   /  AST  24  /  ALT  19  /  AlkPhos  87  10-    LIVER FUNCTIONS - ( 21 Oct 2020 06:23 )  Alb: 3.9 g/dL / Pro: 7.4 g/dL / ALK PHOS: 87 U/L / ALT: 19 U/L / AST: 24 U/L / GGT: x           PT/INR - ( 21 Oct 2020 06:23 )   PT: 13.6 sec;   INR: 1.14 ratio         PTT - ( 21 Oct 2020 06:23 )  PTT:37.5 sec  Urinalysis Basic - ( 21 Oct 2020 09:10 )    Color: Yellow / Appearance: Clear / S.040 / pH: x  Gluc: x / Ketone: Negative  / Bili: Negative / Urobili: Negative   Blood: x / Protein: Trace / Nitrite: Negative   Leuk Esterase: Negative / RBC: 5 /hpf / WBC 3 /HPF   Sq Epi: x / Non Sq Epi: 2 /hpf / Bacteria: Negative      Amylase Serum--      Lipase serum18       Ammonia--      Imaging:      < from: CT Abdomen and Pelvis w/ IV Cont (10.21.20 @ 08:23) >    EXAM:  CT ABDOMEN AND PELVIS IC                            PROCEDURE DATE:  10/21/2020            INTERPRETATION:  CLINICAL INFORMATION: Left lower quadrant pain.    COMPARISON: CT 2011.    PROCEDURE:  CT of the Abdomen and Pelvis was performed with intravenous contrast.  Intravenous contrast: 90 ml Omnipaque 350. 10 ml discarded.  Oral contrast: None.  Sagittal and coronal reformats were performed.    FINDINGS:  LOWER CHEST: Within normal limits.    LIVER: Within normal limits.  BILE DUCTS: Normal caliber.  GALLBLADDER: Within normal limits.  SPLEEN: Within normal limits.  PANCREAS: Within normal limits.  ADRENALS: Within normal limits.  KIDNEYS/URETERS: Negative necrosis. Renal cysts.    BLADDER: Within normal limits.  REPRODUCTIVE ORGANS: Fibroid uterus.    BOWEL: No bowel obstruction. Appendix is not visualized. Scattered colonic diverticulosis without evidence of diverticulitis.  PERITONEUM: No ascites.  VESSELS: Within normal limits.  RETROPERITONEUM/LYMPH NODES: No lymphadenopathy.  ABDOMINAL WALL: Encapsulated fat density structures in the left posterior lateral chest subcutaneous tissues, measuring up to 6.3 cm with associated tiny calcifications.  BONES: Degenerative changes of the spine. Sclerotic focus in the right iliac bone, likely bone island.    IMPRESSION:  Colonic diverticulosis without evidence of diverticulitis.    Left posterolateral subcutaneous fatty mass, may represent fat necrosis or atypical lipoma.                    SHADI BOBO MD; Attending Radiologist  This document has been electronically signed. Oct 21 2020  8:50AM    < end of copied text >

## 2020-10-21 NOTE — ED PROVIDER NOTE - OBJECTIVE STATEMENT
59 y/o F with PMH Pre-DM, HTN, Asthma presents to ED with multiple medical complaints.   Patient endorses total weakness for 6.5 weeks. For the last 2 weeks patient has been having of lower abdominal cramping. Endorse nausea with dry heaving for the last week. Progressively patient states that weakness has increased that she cannot physically walk around her home. For the last two days also states that she had been having bright red blood in her stool. Last BM <24 hours ago, described as "soft and mushy." Occasional coughing at baseline 2/2 asthma. Was suppose to see a GI provider but states that she was too weak to make an appointment.  No blood thinner use. Denies allergies. No chest pain, fever, urinary symptoms, vomiting.  PCP Janeth Alston ([place])

## 2020-10-21 NOTE — H&P ADULT - NSICDXPASTMEDICALHX_GEN_ALL_CORE_FT
PAST MEDICAL HISTORY:  Asthma     HTN (hypertension)     MVP (mitral valve prolapse)     Uterine fibroid     Vertigo

## 2020-10-21 NOTE — CONSULT NOTE ADULT - SUBJECTIVE AND OBJECTIVE BOX
Cardiology Attending    HISTORY OF PRESENT ILLNESS:   Ms Nolan is a 59yo woman who presents with shortness of breath and exertional pre-syncope.  Feels nauseated every day, and eats only ~1 meal per day.  Dry heaving at many intervals during the day but no vomiting.    She states that the onset of symptoms was >6 months ago, with severity worsening over time.    She has progressed to spending >22hrs/day in bed or in the recliner, and is unable to complete most iADL's without some help.  If she can complete an activity like washing up, getting dressed or preparing some food, she needs to rest afterwards.  When walking across her home, she feels like she will collapse and lose consciousness if she does not stop to rest in each room.  Has no vertiginous symptoms.  Shortness of breath is difficult for her to characterize.  The chest does not feel tight, but that the air flow is not relieving her rapid onset of fatigue.  Not having any angina, palpitations, complete syncope, LE edema.  Has lost ~7 lbs in the last few months.    She reports a couple of bloody bowel movements in the last two days.      States her diagnosis of Asthma was >10 yrs ago in primary care office.  Did PFT's, and was started on albuterol and a nebulizer.  Has not ever seen a pulmonologist.    Has never had a colonoscopy or EGD, has not seen a GI doctor.  Has not seen anyone but primary care who prescribed anti-emetics / anti-nausea drugs, which have not helped.    Has seen an [facility] Cardiologist ~7 yrs ago. Was told she has Mitral Valve Prolapse, but is not aware of any heart failure or arrhythmia syndromes that go with it. States she had an echo and stress test many years ago. Has had no reason to return for cardiovascular care. No hx of MI, CHF, or arrhythmia.  Has orthopnea. Prefers to sleep in a recliner.    PAST MEDICAL & SURGICAL HISTORY:  Uterine fibroid    MVP (mitral valve prolapse)    Asthma    Vertigo    HTN (hypertension)    S/P  section    MEDICATIONS  (STANDING):  influenza   Vaccine 0.5 milliLiter(s) IntraMuscular once    Allergies    No Known Allergies    Intolerances    FAMILY HISTORY:  No pertinent family history in first degree relatives    Non-contributary for premature coronary disease or sudden cardiac death    SOCIAL HISTORY:    [x ] Non-smoker  [ ] Smoker  [ ] Alcohol    PHYSICAL EXAM:  T(C): 37 (10-21-20 @ 11:29), Max: 37.4 (10-21-20 @ 06:35)  HR: 78 (10-21-20 @ 11:29) (64 - 78)  BP: 110/60 (10-21-20 @ 11:29) (98/54 - 123/79)  RR: 17 (10-21-20 @ 11:29) (17 - 20)  SpO2: 98% (10-21-20 @ 11:29) (95% - 98%)  Wt(kg): --    Appearance: Obese white female in no acute distress (BMI 46), able to speak in full sentences, nondiaphoretic.  HEENT:   Normal oral mucosa, PERRL, EOMI	  Lymphatic: No lymphadenopathy , trace ankle edema  Cardiovascular: Normal S1 S2, No JVD, No murmurs , Peripheral pulses palpable 2+ bilaterally  Respiratory: Lungs clear to auscultation, normal effort 	  Gastrointestinal:  Soft, Non-tender, + BS, organomegaly assessment limited due to body habitus and inability to lie down in bed.	  Skin: No rashes, No ecchymoses, No cyanosis, warm to touch  Musculoskeletal: Normal range of motion, normal strength  Psychiatry:  Mood & affect appropriate      TELEMETRY: NSR w/ APC's	    ECG:  NSR w/ APC's.  CT :   Non-gated images of the heart suggest normal LV and RV size.  The LA appears enlarged relative to the LV.  Pericardium is thin, no pericardial effusion.  No nodules or effusions in the lungs.  	Gallbladder present.  single renal cyst BL.  Large irregular shaped fibroid uterus.  Overall very minimal muscle mass relative to adipose volume.  LABS:	 	                          16.1   6.28  )-----------( 315      ( 21 Oct 2020 06:23 )             52.1     10-21    142  |  105  |  18  ----------------------------<  97  3.6   |  25  |  0.99    Ca    9.6      21 Oct 2020 06:23    TPro  7.4  /  Alb  3.9  /  TBili  0.5  /  DBili  x   /  AST  24  /  ALT  19  /  AlkPhos  87  10-21    ASSESSMENT/PLAN: 	60y Female with insidious onset of shortness of breath and pre-syncope.  NYHA IIIB functional status, and is only comfortable at rest.  Has daily episodes of nausea and dry-heaving without vomiting.  Presented to hospital out of frustration for inability to take care of herself at home.    Asthma diagnosis is in question.  Consider pulmonology consultation.  Monitor on telemetry, replace K to 4, Mg to 2.  Order an Echocardiogram.  Clinically, is not having an acute coronary syndrome.  Will follow.    Agus Negron M.D.  Cardiac Electrophysiology    office [phone]  pager [phone]

## 2020-10-21 NOTE — ED PROVIDER NOTE - PHYSICAL EXAMINATION
GEN: Well Appearing, Nontoxic, NAD  HEENT: NC/AT, Symm Facies. EOMI  CV: No JVD/Bruits or stridor;  +S1S2, RRR w/o m/g/r  RESP: CTAB w/o w/r/r  ABD: Soft, mild ttp LLQ to deep palpation /nd, +BS. No guarding/rebound. No RUQ tender  EXT/MSK: No lower extremity edema or calf tenderness. FROMx4  SKIN: No erythema, lesions or rash  Neuro: Grossly intact, AOX3 with normal speech, no focal deficits  Rectal: No masses felt. No gross blood visualized. Yellow-brown liquid stool visualized.  Chaperon: Romero HOPKINS

## 2020-10-21 NOTE — ED PROVIDER NOTE - PROGRESS NOTE DETAILS
Told patient that we were going to give 1L NS via IV, patient concerned about having to use the bathroom more frequently, offered to give half for now, patient agrees. - Martina Huizar PA-C

## 2020-10-21 NOTE — H&P ADULT - NSHPPHYSICALEXAM_GEN_ALL_CORE
General: WN/WD NAD  PERRLA  Neurology: A&Ox3, nonfocal, MURPHY x 4  Respiratory: CTA B/L  CV: RRR, S1S2, no murmurs, rubs or gallops  Abdominal: Soft, NT, ND +BS, Last BM  Extremities: No edema, + peripheral pulses  Skin Normal

## 2020-10-21 NOTE — ED ADULT NURSE REASSESSMENT NOTE - NS ED NURSE REASSESS COMMENT FT1
Pt. stood to use commode at bedside. Urine sample obtained and sent to lab. Will continue to reassess.

## 2020-10-21 NOTE — CONSULT NOTE ADULT - PROBLEM SELECTOR RECOMMENDATION 9
- CT a/p demonstrating colonic diverticulosis without diverticulitis, and a L posterolateral fatty mass  - do not suspect this as source of pain   - possibly related to excess gas as patient reports pain is intermittent in nature and improved with flatulence  - will start simethicone 80mg TID   - started high-fiber diet  - d/w patient and GI attending Dr. Anderson

## 2020-10-21 NOTE — ED ADULT NURSE NOTE - NSIMPLEMENTINTERV_GEN_ALL_ED
Implemented All Fall Risk Interventions:  [place] to call system. Call bell, personal items and telephone within reach. Instruct patient to call for assistance. Room bathroom lighting operational. Non-slip footwear when patient is off stretcher. Physically safe environment: no spills, clutter or unnecessary equipment. Stretcher in lowest position, wheels locked, appropriate side rails in place. Provide visual cue, wrist band, yellow gown, etc. Monitor gait and stability. Monitor for mental status changes and reorient to person, place, and time. Review medications for side effects contributing to fall risk. Reinforce activity limits and safety measures with patient and family.

## 2020-10-21 NOTE — ED ADULT NURSE NOTE - OBJECTIVE STATEMENT
Patient is a 60 year old female coming into the ED via private car complaining of abdominal pain and nausea. A&Ox4 speaking in full sentences. Patient came into the ED complaining of Patient is a 60 year old female coming into the ED via private car complaining of abdominal pain and nausea. A&Ox4 speaking in full sentences. Patient states she has been having 6/10 lower abdominal pain and nausea for 6 weeks. Patient states she has been dry heaving but has not vomited. Patient also reports having bright red blood when she wipes and in her stool the last 2 days. Patient states her bowel movements have been soft but she has not had diarrhea. Upon inspection patients abdomen is soft, tender to palpation, and nondistended. Patient denies use of anticoagulants. Patient is a 60 year old female coming into the ED via private car complaining of abdominal pain and nausea. A&Ox4 speaking in full sentences. Patient states she has been having 6/10 lower abdominal pain and nausea for 6 weeks. Patient states she has been dry heaving but has not vomited. Patient also reports having bright red blood when she wipes and in her stool the last 2 days. Patient states her bowel movements have been soft but she has not had diarrhea. Upon inspection patients abdomen is soft, tender to palpation, and nondistended. Patient denies use of anticoagulants. Patient reports a mild headache and chest pressure but she "isn't sure if it's my heart or because of this." Patient reported also having some shortness of breath and stated "I'm not sure if it's my asthma." Lung sounds clear. S1 and S2 heard on auscultation. Patient denies any urinary frequency, burning, hematuria, V/D.

## 2020-10-21 NOTE — ED PROVIDER NOTE - CLINICAL SUMMARY MEDICAL DECISION MAKING FREE TEXT BOX
60F presenting with complaint of diffuse abdominal discomfort x2 days with ?red blood per rectum intermittently x2 days, never had a colonoscopy, denies fevers/chills, on exam with ttp llq though exam difficult given patient habitus, will check labs, ct, follow up studies, reassess, dispo pending workup.

## 2020-10-21 NOTE — H&P ADULT - HISTORY OF PRESENT ILLNESS
59 y/o F with PMH Pre-DM, HTN, Asthma presents to ED with multiple medical complaints. Patient endorses total weakness for 6.5 weeks. For the last 2 weeks patient has been having of intermittent lower abdominal cramping. Endorse nausea with dry heaving for the last week. Patient endorses improvement in abdominal discomfort with flatulence. Progressively patient states that weakness has increased that she cannot physically walk around her home. For the last two days also states that she had been having bright red blood in her stool. Last BM <24 hours ago, described as "soft and mushy." Occasional coughing at baseline 2/2 asthma. Was suppose to see a GI provider but states that she was too weak to make an appointment. No prior history of upper gastrointestinal endoscopy or colonoscopy.

## 2020-10-22 LAB
ALBUMIN SERPL ELPH-MCNC: 3.3 G/DL — SIGNIFICANT CHANGE UP (ref 3.3–5)
ALP SERPL-CCNC: 73 U/L — SIGNIFICANT CHANGE UP (ref 40–120)
ALT FLD-CCNC: 16 U/L — SIGNIFICANT CHANGE UP (ref 10–45)
ANION GAP SERPL CALC-SCNC: 9 MMOL/L — SIGNIFICANT CHANGE UP (ref 5–17)
AST SERPL-CCNC: 24 U/L — SIGNIFICANT CHANGE UP (ref 10–40)
BILIRUB SERPL-MCNC: 0.4 MG/DL — SIGNIFICANT CHANGE UP (ref 0.2–1.2)
BUN SERPL-MCNC: 22 MG/DL — SIGNIFICANT CHANGE UP (ref 7–23)
CALCIUM SERPL-MCNC: 9 MG/DL — SIGNIFICANT CHANGE UP (ref 8.4–10.5)
CHLORIDE SERPL-SCNC: 106 MMOL/L — SIGNIFICANT CHANGE UP (ref 96–108)
CO2 SERPL-SCNC: 25 MMOL/L — SIGNIFICANT CHANGE UP (ref 22–31)
CREAT SERPL-MCNC: 0.91 MG/DL — SIGNIFICANT CHANGE UP (ref 0.5–1.3)
GLUCOSE SERPL-MCNC: 89 MG/DL — SIGNIFICANT CHANGE UP (ref 70–99)
HCT VFR BLD CALC: 46.1 % — HIGH (ref 34.5–45)
HCV AB S/CO SERPL IA: 0.09 S/CO — SIGNIFICANT CHANGE UP (ref 0–0.99)
HCV AB SERPL-IMP: SIGNIFICANT CHANGE UP
HGB BLD-MCNC: 14.2 G/DL — SIGNIFICANT CHANGE UP (ref 11.5–15.5)
MCHC RBC-ENTMCNC: 24.5 PG — LOW (ref 27–34)
MCHC RBC-ENTMCNC: 30.8 GM/DL — LOW (ref 32–36)
MCV RBC AUTO: 79.6 FL — LOW (ref 80–100)
NRBC # BLD: 0 /100 WBCS — SIGNIFICANT CHANGE UP (ref 0–0)
PLATELET # BLD AUTO: 244 K/UL — SIGNIFICANT CHANGE UP (ref 150–400)
POTASSIUM SERPL-MCNC: 3.8 MMOL/L — SIGNIFICANT CHANGE UP (ref 3.5–5.3)
POTASSIUM SERPL-SCNC: 3.8 MMOL/L — SIGNIFICANT CHANGE UP (ref 3.5–5.3)
PROT SERPL-MCNC: 6.1 G/DL — SIGNIFICANT CHANGE UP (ref 6–8.3)
RBC # BLD: 5.79 M/UL — HIGH (ref 3.8–5.2)
RBC # FLD: 15.5 % — HIGH (ref 10.3–14.5)
SARS-COV-2 IGG SERPL QL IA: NEGATIVE — SIGNIFICANT CHANGE UP
SARS-COV-2 IGM SERPL IA-ACNC: <0.1 INDEX — SIGNIFICANT CHANGE UP
SODIUM SERPL-SCNC: 140 MMOL/L — SIGNIFICANT CHANGE UP (ref 135–145)
WBC # BLD: 5.77 K/UL — SIGNIFICANT CHANGE UP (ref 3.8–10.5)
WBC # FLD AUTO: 5.77 K/UL — SIGNIFICANT CHANGE UP (ref 3.8–10.5)

## 2020-10-22 RX ORDER — SODIUM CHLORIDE 9 MG/ML
1000 INJECTION INTRAMUSCULAR; INTRAVENOUS; SUBCUTANEOUS ONCE
Refills: 0 | Status: COMPLETED | OUTPATIENT
Start: 2020-10-22 | End: 2020-10-22

## 2020-10-22 RX ADMIN — Medication 30 MILLILITER(S): at 00:16

## 2020-10-22 RX ADMIN — AMLODIPINE BESYLATE 5 MILLIGRAM(S): 2.5 TABLET ORAL at 11:04

## 2020-10-22 RX ADMIN — Medication 5 MILLIGRAM(S): at 00:16

## 2020-10-22 RX ADMIN — SODIUM CHLORIDE 1000 MILLILITER(S): 9 INJECTION INTRAMUSCULAR; INTRAVENOUS; SUBCUTANEOUS at 11:51

## 2020-10-22 RX ADMIN — Medication 25 MILLIGRAM(S): at 17:29

## 2020-10-22 NOTE — PHYSICAL THERAPY INITIAL EVALUATION ADULT - STRENGTHENING, PT EVAL
GOAL: Pt will improve bilateral LE strength by one grade, for increased limb stability, to improve gait and facilitate stair negotiation in 2 weeks.

## 2020-10-22 NOTE — PHYSICAL THERAPY INITIAL EVALUATION ADULT - ACTIVE RANGE OF MOTION EXAMINATION, REHAB EVAL
chad. upper extremity Active ROM was WNL (within normal limits)/bilateral lower extremity Active ROM was WNL (within normal limits)

## 2020-10-22 NOTE — PROGRESS NOTE ADULT - ASSESSMENT
59 y/o F with PMH Pre-DM, HTN, Asthma presents to ED with multiple medical complaints. Patient endorses total weakness for 6.5 weeks. For the last 2 weeks patient has been having of intermittent lower abdominal cramping. Endorse nausea with dry heaving for the last week. Patient endorses improvement in abdominal discomfort with flatulence. Progressively patient states that weakness has increased that she cannot physically walk around her home. For the last two days also states that she had been having bright red blood in her stool. Last BM <24 hours ago, described as "soft and mushy." Occasional coughing at baseline 2/2 asthma. Was suppose to see a GI provider but states that she was too weak to make an appointment. No prior history of upper gastrointestinal endoscopy or colonoscopy.     Problem/Plan - 1:  ·  Problem: Abdominal pain.  Plan: CT a/p demonstrating colonic diverticulosis without diverticulitis, and a L posterolateral fatty mass  - do not suspect this as source of pain   - possibly related to excess gas as patient reports pain is intermittent in nature and improved with flatulence  - will start simethicone 80mg TID   - started high-fiber diet.   - no further GI murphy     Problem/Plan - 2:  ·  Problem: HTN (hypertension).  Plan: cw home meds.     Problem/Plan - 3:  ·  Problem: Asthma.  Plan: cw albterol HFA.

## 2020-10-22 NOTE — PROGRESS NOTE ADULT - SUBJECTIVE AND OBJECTIVE BOX
S: Still reports lightheadedness when sitting up and standing as if about to pass out. c/o nausea. Denies chest pain or SOB at rest. Review of systems otherwise (-)    	    MEDICATIONS  (STANDING):  amLODIPine   Tablet 5 milliGRAM(s) Oral daily  influenza   Vaccine 0.5 milliLiter(s) IntraMuscular once  lisinopril 20 milliGRAM(s) Oral daily  metoprolol tartrate 25 milliGRAM(s) Oral two times a day  pantoprazole    Tablet 40 milliGRAM(s) Oral daily  simethicone 80 milliGRAM(s) Chew three times a day  sucralfate suspension 1 Gram(s) Oral two times a day      LABS:	 	                          14.2   5.77  )-----------( 244      ( 22 Oct 2020 06:17 )             46.1     Hemoglobin: 14.2 g/dL (10-22 @ 06:17)  Hemoglobin: 16.1 g/dL (10-21 @ 06:23)    10-22    140  |  106  |  22  ----------------------------<  89  3.8   |  25  |  0.91    Ca    9.0      22 Oct 2020 06:17    TPro  6.1  /  Alb  3.3  /  TBili  0.4  /  DBili  x   /  AST  24  /  ALT  16  /  AlkPhos  73  10-22    Creatinine Trend: 0.91<--, 0.99<--  COAGS:       proBNP:   Lipid Profile:   HgA1c:   TSH:     PHYSICAL EXAM:  T(C): 36.9 (10-22-20 @ 12:28), Max: 36.9 (10-22-20 @ 12:28)  HR: 68 (10-22-20 @ 12:28) (66 - 82)  BP: 120/82 (10-22-20 @ 12:28) (99/68 - 133/70)  RR: 18 (10-22-20 @ 12:28) (17 - 18)  SpO2: 96% (10-22-20 @ 12:28) (94% - 96%)  Wt(kg): --  I&O's Summary    21 Oct 2020 07:01  -  22 Oct 2020 07:00  --------------------------------------------------------  IN: 240 mL / OUT: 0 mL / NET: 240 mL    22 Oct 2020 07:01  -  22 Oct 2020 15:09  --------------------------------------------------------  IN: 240 mL / OUT: 0 mL / NET: 240 mL      Gen: Appears well in NAD  HEENT:  (-)icterus (-)pallor  CV: N S1 S2 1/6 YEVGENIY (+)2 Pulses B/l  Resp:  Clear to auscultation B/L, normal effort  GI: (+) BS Soft, NT, ND  Lymph:  (-)Edema, (-)obvious lymphadenopathy  Skin: Warm to touch, Normal turgor  Psych: Appropriate mood and affect      TELEMETRY: SR 70s	      ECG:  NSR w/ APC's.    Echo: Pending    CT :   Non-gated images of the heart suggest normal LV and RV size.  The LA appears enlarged relative to the LV.  Pericardium is thin, no pericardial effusion.  No nodules or effusions in the lungs.  	Gallbladder present.  single renal cyst BL.  Large irregular shaped fibroid uterus.  Overall very minimal muscle mass relative to adipose volume.  LABS:	 	    ASSESSMENT/PLAN: 60y Female with PMH of HTN and Asthma who presents with insidious onset of shortness of breath and pre-syncope.  NYHA IIIB functional status, and is only comfortable at rest.  Has daily episodes of nausea and dry-heaving without vomiting.  Presented to hospital out of frustration for inability to take care of herself at home. Has seen an [facility] Cardiologist ~7 yrs ago. Was told she has Mitral Valve Prolapse, but is not aware of any heart failure or arrhythmia syndromes that go with it. States she had an echo and stress test many years ago. Has had no reason to return for cardiovascular care. No hx of MI, CHF, or arrhythmia.  Has orthopnea. Prefers to sleep in a recliner.  	  - MI ruled out, no sign of ACS  - Asthma diagnosis is in question.  Consider pulmonology consultation.  - Monitor on telemetry, replace K to 4, Mg to 2.  - Positive orthostatics noted - receiving IVF today - recheck in AM, will d/c HCTZ  - Monitor BP on Norvasc, Lisinopril, Metoprolol  - GI follow up  - Check Echocardiogram  - Further recs pending above    Kleber Grace PA-C  Pager: [phone]

## 2020-10-22 NOTE — PROGRESS NOTE ADULT - SUBJECTIVE AND OBJECTIVE BOX
Patient is a 60y old  Female who presents with a chief complaint of Presyncope (21 Oct 2020 15:10)      INTERVAL HPI/OVERNIGHT EVENTS:  T(C): 36.9 (10-22-20 @ 12:28), Max: 36.9 (10-22-20 @ 12:28)  HR: 68 (10-22-20 @ 12:28) (68 - 82)  BP: 120/82 (10-22-20 @ 12:28) (99/68 - 133/70)  RR: 18 (10-22-20 @ 12:28) (17 - 18)  SpO2: 96% (10-22-20 @ 12:28) (94% - 96%)  Wt(kg): --  I&O's Summary    21 Oct 2020 07:  -  22 Oct 2020 07:00  --------------------------------------------------------  IN: 240 mL / OUT: 0 mL / NET: 240 mL    22 Oct 2020 07:01  -  22 Oct 2020 15:51  --------------------------------------------------------  IN: 240 mL / OUT: 0 mL / NET: 240 mL        LABS:                        14.2   5.77  )-----------( 244      ( 22 Oct 2020 06:17 )             46.1     10-    140  |  106  |  22  ----------------------------<  89  3.8   |  25  |  0.91    Ca    9.0      22 Oct 2020 06:17    TPro  6.1  /  Alb  3.3  /  TBili  0.4  /  DBili  x   /  AST  24  /  ALT  16  /  AlkPhos  73  10-22    PT/INR - ( 21 Oct 2020 06:23 )   PT: 13.6 sec;   INR: 1.14 ratio         PTT - ( 21 Oct 2020 06:23 )  PTT:37.5 sec  Urinalysis Basic - ( 21 Oct 2020 09:10 )    Color: Yellow / Appearance: Clear / S.040 / pH: x  Gluc: x / Ketone: Negative  / Bili: Negative / Urobili: Negative   Blood: x / Protein: Trace / Nitrite: Negative   Leuk Esterase: Negative / RBC: 5 /hpf / WBC 3 /HPF   Sq Epi: x / Non Sq Epi: 2 /hpf / Bacteria: Negative      CAPILLARY BLOOD GLUCOSE            Urinalysis Basic - ( 21 Oct 2020 09:10 )    Color: Yellow / Appearance: Clear / S.040 / pH: x  Gluc: x / Ketone: Negative  / Bili: Negative / Urobili: Negative   Blood: x / Protein: Trace / Nitrite: Negative   Leuk Esterase: Negative / RBC: 5 /hpf / WBC 3 /HPF   Sq Epi: x / Non Sq Epi: 2 /hpf / Bacteria: Negative        MEDICATIONS  (STANDING):  amLODIPine   Tablet 5 milliGRAM(s) Oral daily  influenza   Vaccine 0.5 milliLiter(s) IntraMuscular once  lisinopril 20 milliGRAM(s) Oral daily  metoprolol tartrate 25 milliGRAM(s) Oral two times a day  pantoprazole    Tablet 40 milliGRAM(s) Oral daily  simethicone 80 milliGRAM(s) Chew three times a day  sucralfate suspension 1 Gram(s) Oral two times a day    MEDICATIONS  (PRN):  ALBUTerol    90 MICROgram(s) HFA Inhaler 2 Puff(s) Inhalation every 6 hours PRN Bronchospasm  aluminum hydroxide/magnesium hydroxide/simethicone Suspension 30 milliLiter(s) Oral every 4 hours PRN Dyspepsia  ondansetron Injectable 4 milliGRAM(s) IV Push every 6 hours PRN Nausea and/or Vomiting          PHYSICAL EXAM:  GENERAL: NAD, well-groomed, well-developed  HEAD:  Atraumatic, Normocephalic  CHEST/LUNG: Clear to percussion bilaterally; No rales, rhonchi, wheezing, or rubs  HEART: Regular rate and rhythm; No murmurs, rubs, or gallops  ABDOMEN: Soft, Nontender, Nondistended; Bowel sounds present  EXTREMITIES:  2+ Peripheral Pulses, No clubbing, cyanosis, or edema  LYMPH: No lymphadenopathy noted  SKIN: No rashes or lesions    Care Discussed with Consultants/Other Providers [ ] YES  [ ] NO

## 2020-10-22 NOTE — PROGRESS NOTE ADULT - SUBJECTIVE AND OBJECTIVE BOX
[facility]  Kye Carter PA-C  [address] [place] [address]  [phone]      INTERVAL HPI/OVERNIGHT EVENTS:    events noted    MEDICATIONS  (STANDING):  amLODIPine   Tablet 5 milliGRAM(s) Oral daily  hydrochlorothiazide 25 milliGRAM(s) Oral daily  influenza   Vaccine 0.5 milliLiter(s) IntraMuscular once  lisinopril 20 milliGRAM(s) Oral daily  metoprolol tartrate 25 milliGRAM(s) Oral two times a day  pantoprazole    Tablet 40 milliGRAM(s) Oral daily  simethicone 80 milliGRAM(s) Chew three times a day  sucralfate suspension 1 Gram(s) Oral two times a day    MEDICATIONS  (PRN):  ALBUTerol    90 MICROgram(s) HFA Inhaler 2 Puff(s) Inhalation every 6 hours PRN Bronchospasm  aluminum hydroxide/magnesium hydroxide/simethicone Suspension 30 milliLiter(s) Oral every 4 hours PRN Dyspepsia  ondansetron Injectable 4 milliGRAM(s) IV Push every 6 hours PRN Nausea and/or Vomiting      Allergies    No Known Allergies    Intolerances        ROS:   General:  No wt loss, fevers, chills, night sweats, fatigue,   Eyes:  Good vision, no reported pain  ENT:  No sore throat, pain, runny nose, dysphagia  CV:  No pain, palpitations, hypo/hypertension  Resp:  No dyspnea, cough, tachypnea, wheezing  GI:  No pain, No nausea, No vomiting, No diarrhea, No constipation, No weight loss, No fever, No pruritis, No rectal bleeding, No tarry stools, No dysphagia,  :  No pain, bleeding, incontinence, nocturia  Muscle:  No pain, weakness  Neuro:  No weakness, tingling, memory problems  Psych:  No fatigue, insomnia, mood problems, depression  Endocrine:  No polyuria, polydipsia, cold/heat intolerance  Heme:  No petechiae, ecchymosis, easy bruisability  Skin:  No rash, tattoos, scars, edema      PHYSICAL EXAM:   Vital Signs:  Vital Signs Last 24 Hrs  T(C): 36.9 (22 Oct 2020 12:28), Max: 36.9 (22 Oct 2020 12:28)  T(F): 98.4 (22 Oct 2020 12:28), Max: 98.4 (22 Oct 2020 12:28)  HR: 68 (22 Oct 2020 12:28) (66 - 82)  BP: 120/82 (22 Oct 2020 12:28) (99/68 - 133/70)  BP(mean): --  RR: 18 (22 Oct 2020 12:) (17 - 18)  SpO2: 96% (22 Oct 2020 12:) (94% - 96%)  Daily     Daily     GENERAL:  Appears stated age, well-groomed, well-nourished, no distress  HEENT:  NC/AT,  conjunctivae clear and pink, no thyromegaly, nodules, adenopathy, no JVD, sclera -anicteric  CHEST:  Full & symmetric excursion, no increased effort, breath sounds clear  HEART:  Regular rhythm, S1, S2, no murmur/rub/S3/S4, no abdominal bruit, no edema  ABDOMEN:  Soft, non-tender, non-distended, normoactive bowel sounds,  no masses ,no hepato-splenomegaly, no signs of chronic liver disease  EXTEREMITIES:  no cyanosis,clubbing or edema  SKIN:  No rash/erythema/ecchymoses/petechiae/wounds/abscess/warm/dry  NEURO:  Alert, oriented, no asterixis, no tremor, no encephalopathy      LABS:                        14.2   5.77  )-----------( 244      ( 22 Oct 2020 06:17 )             46.1     10-    140  |  106  |  22  ----------------------------<  89  3.8   |  25  |  0.91    Ca    9.0      22 Oct 2020 06:17    TPro  6.1  /  Alb  3.3  /  TBili  0.4  /  DBili  x   /  AST  24  /  ALT  16  /  AlkPhos  73  10-22    PT/INR - ( 21 Oct 2020 06:23 )   PT: 13.6 sec;   INR: 1.14 ratio         PTT - ( 21 Oct 2020 06:23 )  PTT:37.5 sec  Urinalysis Basic - ( 21 Oct 2020 09:10 )    Color: Yellow / Appearance: Clear / S.040 / pH: x  Gluc: x / Ketone: Negative  / Bili: Negative / Urobili: Negative   Blood: x / Protein: Trace / Nitrite: Negative   Leuk Esterase: Negative / RBC: 5 /hpf / WBC 3 /HPF   Sq Epi: x / Non Sq Epi: 2 /hpf / Bacteria: Negative        RADIOLOGY & ADDITIONAL TESTS:

## 2020-10-22 NOTE — PHYSICAL THERAPY INITIAL EVALUATION ADULT - PRECAUTIONS/LIMITATIONS, REHAB EVAL
fall precautions/For the last two days also states that she had been having bright red blood in her stool. Occasional coughing at baseline 2/2 asthma. Was suppose to see a GI provider but states that she was too weak to make an appointment. CT Abdomen and Pelvis 10/21: Colonic diverticulosis without evidence of diverticulitis. Left posterolateral subcutaneous fatty mass, may represent fat necrosis or atypical lipoma. (-) CXR 10/21.

## 2020-10-22 NOTE — PROGRESS NOTE ADULT - ASSESSMENT
orthostatic hypotension  abdominal pain    CT negative   doubt gi pathology  cardiac workup ongoing  tele monitoring  echo pending   no immediate plan for gi workup    Advanced care planning was discussed with patient and family.  Advanced care planning forms were reviewed and discussed.  Risks, benefits and alternatives of gastroenterologic procedures were discussed in detail and all questions were answered.    30 minutes spent.

## 2020-10-22 NOTE — CHART NOTE - NSCHARTNOTEFT_GEN_A_CORE
Patient was admitted yesterday with progressive weakness, Abd pain, presyncope.  Noted (+) orthostatic hypotension . As per cardiology note, patient need Telemetry monitoring  DW Dr Miles, agreed for Tele  monitoring. Started IVF for orthostatic hypotension.  Patient transferred to [address] [place] [address], report given to ACP.   Kyra Kilgore NP  [phone]

## 2020-10-22 NOTE — PHYSICAL THERAPY INITIAL EVALUATION ADULT - ADDITIONAL COMMENTS
Pt lives with her  and handicapped son in an apartment with no MART[employer], has elevator access. Previously (I) in all ADLs, ambulates without an assistive device and drives independently. Has a tub shower, (-) grab bars. Does not own any DME.

## 2020-10-22 NOTE — PHYSICAL THERAPY INITIAL EVALUATION ADULT - PERTINENT HX OF CURRENT PROBLEM, REHAB EVAL
61yo F PMH Pre-DM, HTN, Asthma p/w presyncope and weakness for 6.5 weeks. For the last 2 weeks pt has been having intermittent lower abdominal cramping and nausea with dry heaving for the last week. Pt endorses improvement in abdominal discomfort with flatulence. Pt states that weakness has progressively increased that she cannot physically walk around her home. CONT'D BELOW:

## 2020-10-23 LAB
ANION GAP SERPL CALC-SCNC: 9 MMOL/L — SIGNIFICANT CHANGE UP (ref 5–17)
BUN SERPL-MCNC: 16 MG/DL — SIGNIFICANT CHANGE UP (ref 7–23)
CALCIUM SERPL-MCNC: 8.8 MG/DL — SIGNIFICANT CHANGE UP (ref 8.4–10.5)
CHLORIDE SERPL-SCNC: 108 MMOL/L — SIGNIFICANT CHANGE UP (ref 96–108)
CO2 SERPL-SCNC: 24 MMOL/L — SIGNIFICANT CHANGE UP (ref 22–31)
CREAT SERPL-MCNC: 0.8 MG/DL — SIGNIFICANT CHANGE UP (ref 0.5–1.3)
CULTURE RESULTS: SIGNIFICANT CHANGE UP
GLUCOSE SERPL-MCNC: 95 MG/DL — SIGNIFICANT CHANGE UP (ref 70–99)
HCT VFR BLD CALC: 44.2 % — SIGNIFICANT CHANGE UP (ref 34.5–45)
HGB BLD-MCNC: 13.8 G/DL — SIGNIFICANT CHANGE UP (ref 11.5–15.5)
MAGNESIUM SERPL-MCNC: 2 MG/DL — SIGNIFICANT CHANGE UP (ref 1.6–2.6)
MCHC RBC-ENTMCNC: 24.4 PG — LOW (ref 27–34)
MCHC RBC-ENTMCNC: 31.2 GM/DL — LOW (ref 32–36)
MCV RBC AUTO: 78.1 FL — LOW (ref 80–100)
NRBC # BLD: 0 /100 WBCS — SIGNIFICANT CHANGE UP (ref 0–0)
PHOSPHATE SERPL-MCNC: 2.8 MG/DL — SIGNIFICANT CHANGE UP (ref 2.5–4.5)
PLATELET # BLD AUTO: 284 K/UL — SIGNIFICANT CHANGE UP (ref 150–400)
POTASSIUM SERPL-MCNC: 3.6 MMOL/L — SIGNIFICANT CHANGE UP (ref 3.5–5.3)
POTASSIUM SERPL-SCNC: 3.6 MMOL/L — SIGNIFICANT CHANGE UP (ref 3.5–5.3)
RBC # BLD: 5.66 M/UL — HIGH (ref 3.8–5.2)
RBC # FLD: 15.3 % — HIGH (ref 10.3–14.5)
SODIUM SERPL-SCNC: 141 MMOL/L — SIGNIFICANT CHANGE UP (ref 135–145)
SPECIMEN SOURCE: SIGNIFICANT CHANGE UP
WBC # BLD: 6.37 K/UL — SIGNIFICANT CHANGE UP (ref 3.8–10.5)
WBC # FLD AUTO: 6.37 K/UL — SIGNIFICANT CHANGE UP (ref 3.8–10.5)

## 2020-10-23 PROCEDURE — 70450 CT HEAD/BRAIN W/O DYE: CPT | Mod: 26

## 2020-10-23 PROCEDURE — 72131 CT LUMBAR SPINE W/O DYE: CPT | Mod: 26

## 2020-10-23 PROCEDURE — 93306 TTE W/DOPPLER COMPLETE: CPT | Mod: 26

## 2020-10-23 RX ADMIN — ONDANSETRON 4 MILLIGRAM(S): 8 TABLET, FILM COATED ORAL at 17:15

## 2020-10-23 RX ADMIN — ONDANSETRON 4 MILLIGRAM(S): 8 TABLET, FILM COATED ORAL at 23:20

## 2020-10-23 NOTE — PROGRESS NOTE ADULT - ASSESSMENT
orthostatic hypotension  abdominal pain    CT negative   doubt gi pathology  cardiac workup ongoing  BP meds dc'd per cardiology   tele monitoring  echo pending   no immediate plan for gi workup  d/w Dr. Anderson     Advanced care planning was discussed with patient and family.  Advanced care planning forms were reviewed and discussed.  Risks, benefits and alternatives of gastroenterologic procedures were discussed in detail and all questions were answered.    30 minutes spent.

## 2020-10-23 NOTE — PROGRESS NOTE ADULT - SUBJECTIVE AND OBJECTIVE BOX
S: Persistent lightheadedness/weakness and nausea with no improvement. Denies chest pain or SOB at rest. Review of systems otherwise (-)      MEDICATIONS  (STANDING):  influenza   Vaccine 0.5 milliLiter(s) IntraMuscular once  pantoprazole    Tablet 40 milliGRAM(s) Oral daily  simethicone 80 milliGRAM(s) Chew three times a day  sucralfate suspension 1 Gram(s) Oral two times a day    MEDICATIONS  (PRN):  ALBUTerol    90 MICROgram(s) HFA Inhaler 2 Puff(s) Inhalation every 6 hours PRN Bronchospasm  aluminum hydroxide/magnesium hydroxide/simethicone Suspension 30 milliLiter(s) Oral every 4 hours PRN Dyspepsia  ondansetron Injectable 4 milliGRAM(s) IV Push every 6 hours PRN Nausea and/or Vomiting      LABS:                            13.8   6.37  )-----------( 284      ( 23 Oct 2020 06:58 )             44.2     Hemoglobin: 13.8 g/dL (10-23 @ 06:58)  Hemoglobin: 14.2 g/dL (10-22 @ 06:17)  Hemoglobin: 16.1 g/dL (10-21 @ 06:23)    10-23    141  |  108  |  16  ----------------------------<  95  3.6   |  24  |  0.80    Ca    8.8      23 Oct 2020 06:59  Phos  2.8     10-23  Mg     2.0     10-23    TPro  6.1  /  Alb  3.3  /  TBili  0.4  /  DBili  x   /  AST  24  /  ALT  16  /  AlkPhos  73  10-22    Creatinine Trend: 0.80<--, 0.91<--, 0.99<--             10-22-20 @ 07:01  -  10-23-20 @ 07:00  --------------------------------------------------------  IN: 480 mL / OUT: 0 mL / NET: 480 mL        PHYSICAL EXAM  Vital Signs Last 24 Hrs  T(C): 37 (23 Oct 2020 11:23), Max: 37.2 (23 Oct 2020 04:48)  T(F): 98.6 (23 Oct 2020 11:23), Max: 98.9 (23 Oct 2020 04:48)  HR: 63 (23 Oct 2020 11:23) (63 - 90)  BP: 132/79 (23 Oct 2020 11:23) (103/67 - 132/79)  BP(mean): --  RR: 18 (23 Oct 2020 11:23) (18 - 20)  SpO2: 95% (23 Oct 2020 11:23) (95% - 97%)        Gen: Appears well in NAD  HEENT:  (-)icterus (-)pallor  CV: N S1 S2 1/6 YEVGENIY (+)2 Pulses B/l  Resp:  Clear to auscultation B/L, normal effort  GI: (+) BS Soft, NT, ND  Lymph:  (-)Edema, (-)obvious lymphadenopathy  Skin: Warm to touch, Normal turgor  Psych: Appropriate mood and affect      TELEMETRY: NSR 60-80      ECG:  NSR w/ APC's.    Echo: Pending    CT :   Non-gated images of the heart suggest normal LV and RV size.  The LA appears enlarged relative to the LV.  Pericardium is thin, no pericardial effusion.  No nodules or effusions in the lungs.  	Gallbladder present.  single renal cyst BL.  Large irregular shaped fibroid uterus.  Overall very minimal muscle mass relative to adipose volume.  LABS:	 	    ASSESSMENT/PLAN: 60y Female with PMH of HTN and Asthma who presents with insidious onset of shortness of breath and pre-syncope.  NYHA IIIB functional status, and is only comfortable at rest.  Has daily episodes of nausea and dry-heaving without vomiting.  Presented to hospital out of frustration for inability to take care of herself at home. Has seen an [facility] Cardiologist ~7 yrs ago. Was told she has Mitral Valve Prolapse, but is not aware of any heart failure or arrhythmia syndromes that go with it. States she had an echo and stress test many years ago. Has had no reason to return for cardiovascular care. No hx of MI, CHF, or arrhythmia.  Has orthopnea. Prefers to sleep in a recliner.  	  - MI ruled out, no sign of ACS  - Asthma diagnosis is in question.  Consider pulmonology consultation.  - Monitor on telemetry, replace K to 4, Mg to 2.  - Orthostatics improved this morning s/p IVF however patient still very symptomatic when standing  - Hold all antihypertensives at this time - monitor BP, can reintroduce as needed  - GI follow up noted  - Check Echocardiogram to r/o structural abnormalities (echo dept called to expedite today)  - Further recs pending above    Kleber Grace PA-C  Pager: [phone]

## 2020-10-23 NOTE — PROVIDER CONTACT NOTE (MEDICATION) - ACTION/TREATMENT ORDERED:
PA aware. To re-assess BP at 0800 and administer medications accordingly. Fall precautions maintained. Will continue to monitor.

## 2020-10-23 NOTE — PROVIDER CONTACT NOTE (MEDICATION) - ASSESSMENT
sinus tachycardia Patient A&O x4. VSS. Orthostatic VS negative this AM; though patient states she "still feels dizzy and weak" when going from lying to sitting up/standing position.  Patient refusing AM dose of: lisinopril 20mg PO QD, amlodipine 5mg PO QD, lopressor 25mg PO BID.

## 2020-10-23 NOTE — PROGRESS NOTE ADULT - SUBJECTIVE AND OBJECTIVE BOX
[facility]  Kye Carter PA-C  [address] [place] [address]  [phone]      INTERVAL HPI/OVERNIGHT EVENTS:    patient seen and examined  c/o nausea worsened with standing/ambulation  tolerating diet  no abdominal pain   +flatus, no bm    MEDICATIONS  (STANDING):  amLODIPine   Tablet 5 milliGRAM(s) Oral daily  hydrochlorothiazide 25 milliGRAM(s) Oral daily  influenza   Vaccine 0.5 milliLiter(s) IntraMuscular once  lisinopril 20 milliGRAM(s) Oral daily  metoprolol tartrate 25 milliGRAM(s) Oral two times a day  pantoprazole    Tablet 40 milliGRAM(s) Oral daily  simethicone 80 milliGRAM(s) Chew three times a day  sucralfate suspension 1 Gram(s) Oral two times a day    MEDICATIONS  (PRN):  ALBUTerol    90 MICROgram(s) HFA Inhaler 2 Puff(s) Inhalation every 6 hours PRN Bronchospasm  aluminum hydroxide/magnesium hydroxide/simethicone Suspension 30 milliLiter(s) Oral every 4 hours PRN Dyspepsia  ondansetron Injectable 4 milliGRAM(s) IV Push every 6 hours PRN Nausea and/or Vomiting      Allergies    No Known Allergies    Intolerances        ROS:   General:  No wt loss, fevers, chills, night sweats, fatigue,   Eyes:  Good vision, no reported pain  ENT:  No sore throat, pain, runny nose, dysphagia  CV:  No pain, palpitations, hypo/hypertension  Resp:  No dyspnea, cough, tachypnea, wheezing  GI:  No pain, +nausea, No vomiting, No diarrhea, No constipation, No weight loss, No fever, No pruritis, No rectal bleeding, No tarry stools, No dysphagia,  :  No pain, bleeding, incontinence, nocturia  Muscle:  No pain, weakness  Neuro:  No weakness, tingling, memory problems  Psych:  No fatigue, insomnia, mood problems, depression  Endocrine:  No polyuria, polydipsia, cold/heat intolerance  Heme:  No petechiae, ecchymosis, easy bruisability  Skin:  No rash, tattoos, scars, edema      PHYSICAL EXAM:   Vital Signs:  Vital Signs Last 24 Hrs  T(C): 36.9 (22 Oct 2020 12:28), Max: 36.9 (22 Oct 2020 12:28)  T(F): 98.4 (22 Oct 2020 12:28), Max: 98.4 (22 Oct 2020 12:28)  HR: 68 (22 Oct 2020 12:28) (66 - 82)  BP: 120/82 (22 Oct 2020 12:28) (99/68 - 133/70)  BP(mean): --  RR: 18 (22 Oct 2020 12:28) (17 - 18)  SpO2: 96% (22 Oct 2020 12:28) (94% - 96%)  Daily     Daily     GENERAL:  no distress  HEENT:  NC/AT  ABDOMEN:  Soft, non-tender, non-distended, normoactive bowel sounds  EXTEREMITIES:  no cyanosis,clubbing or edema  SKIN:  No rash/erythema/ecchymoses/petechiae/wounds/abscess/warm/dry  NEURO:  Alert, oriented, no asterixis, no tremor, no encephalopathy      LABS:                        14.2   5.77  )-----------( 244      ( 22 Oct 2020 06:17 )             46.1     10-22    140  |  106  |  22  ----------------------------<  89  3.8   |  25  |  0.91    Ca    9.0      22 Oct 2020 06:17    TPro  6.1  /  Alb  3.3  /  TBili  0.4  /  DBili  x   /  AST  24  /  ALT  16  /  AlkPhos  73  10-22    PT/INR - ( 21 Oct 2020 06:23 )   PT: 13.6 sec;   INR: 1.14 ratio         PTT - ( 21 Oct 2020 06:23 )  PTT:37.5 sec  Urinalysis Basic - ( 21 Oct 2020 09:10 )    Color: Yellow / Appearance: Clear / S.040 / pH: x  Gluc: x / Ketone: Negative  / Bili: Negative / Urobili: Negative   Blood: x / Protein: Trace / Nitrite: Negative   Leuk Esterase: Negative / RBC: 5 /hpf / WBC 3 /HPF   Sq Epi: x / Non Sq Epi: 2 /hpf / Bacteria: Negative        RADIOLOGY & ADDITIONAL TESTS:

## 2020-10-23 NOTE — PROGRESS NOTE ADULT - SUBJECTIVE AND OBJECTIVE BOX
Patient is a 60y old  Female who presents with a chief complaint of SOB (22 Oct 2020 15:50)      INTERVAL HPI/OVERNIGHT EVENTS:  T(C): 37 (10-23-20 @ 11:23), Max: 37.2 (10-23-20 @ 04:48)  HR: 63 (10-23-20 @ 11:23) (63 - 90)  BP: 132/79 (10-23-20 @ 11:23) (103/67 - 132/79)  RR: 18 (10-23-20 @ 11:23) (18 - 20)  SpO2: 95% (10-23-20 @ 11:23) (95% - 97%)  Wt(kg): --  I&O's Summary    22 Oct 2020 07:01  -  23 Oct 2020 07:00  --------------------------------------------------------  IN: 480 mL / OUT: 0 mL / NET: 480 mL        LABS:                        13.8   6.37  )-----------( 284      ( 23 Oct 2020 06:58 )             44.2     10-23    141  |  108  |  16  ----------------------------<  95  3.6   |  24  |  0.80    Ca    8.8      23 Oct 2020 06:59  Phos  2.8     10-23  Mg     2.0     10-23    TPro  6.1  /  Alb  3.3  /  TBili  0.4  /  DBili  x   /  AST  24  /  ALT  16  /  AlkPhos  73  10-22        CAPILLARY BLOOD GLUCOSE                MEDICATIONS  (STANDING):  influenza   Vaccine 0.5 milliLiter(s) IntraMuscular once  pantoprazole    Tablet 40 milliGRAM(s) Oral daily  simethicone 80 milliGRAM(s) Chew three times a day  sucralfate suspension 1 Gram(s) Oral two times a day    MEDICATIONS  (PRN):  ALBUTerol    90 MICROgram(s) HFA Inhaler 2 Puff(s) Inhalation every 6 hours PRN Bronchospasm  aluminum hydroxide/magnesium hydroxide/simethicone Suspension 30 milliLiter(s) Oral every 4 hours PRN Dyspepsia  ondansetron Injectable 4 milliGRAM(s) IV Push every 6 hours PRN Nausea and/or Vomiting          PHYSICAL EXAM:  GENERAL: NAD, well-groomed, well-developed  HEAD:  Atraumatic, Normocephalic  CHEST/LUNG: Clear to percussion bilaterally; No rales, rhonchi, wheezing, or rubs  HEART: Regular rate and rhythm; No murmurs, rubs, or gallops  ABDOMEN: Soft, Nontender, Nondistended; Bowel sounds present  EXTREMITIES:  2+ Peripheral Pulses, No clubbing, cyanosis, or edema  LYMPH: No lymphadenopathy noted  SKIN: No rashes or lesions    Care Discussed with Consultants/Other Providers [ ] YES  [ ] NO

## 2020-10-23 NOTE — CONSULT NOTE ADULT - SUBJECTIVE AND OBJECTIVE BOX
[facility]        Patient is a 60y old  Female who presents with a chief complaint of dizziness (23 Oct 2020 15:14)    Excerpt from H&P," 59 y/o F with PMH Pre-DM, HTN, Asthma presents to ED with multiple medical complaints. Patient endorses total weakness for 6.5 weeks. For the last 2 weeks patient has been having of intermittent lower abdominal cramping. Endorse nausea with dry heaving for the last week. Patient endorses improvement in abdominal discomfort with flatulence. Progressively patient states that weakness has increased that she cannot physically walk around her home. For the last two days also states that she had been having bright red blood in her stool. Last BM <24 hours ago, described as "soft and mushy." Occasional coughing at baseline 2/2 asthma. Was suppose to see a GI provider but states that she was too weak to make an appointment. No prior history of upper gastrointestinal endoscopy or colonoscopy.  (21 Oct 2020 17:23)           *****PAST MEDICAL / Surgical  HISTORY:  PAST MEDICAL & SURGICAL HISTORY:  Uterine fibroid    MVP (mitral valve prolapse)    Asthma    Vertigo    HTN (hypertension)    S/P  section             *****FAMILY HISTORY:  FAMILY HISTORY:  No pertinent family history in first degree relatives             *****SOCIAL HISTORY:  Alcohol: None  Smoking: None         *****ALLERGIES:   Allergies    No Known Allergies    Intolerances             *****MEDICATIONS: current medication reviewed and documented.   MEDICATIONS  (STANDING):  influenza   Vaccine 0.5 milliLiter(s) IntraMuscular once  pantoprazole    Tablet 40 milliGRAM(s) Oral daily  simethicone 80 milliGRAM(s) Chew three times a day  sucralfate suspension 1 Gram(s) Oral two times a day    MEDICATIONS  (PRN):  ALBUTerol    90 MICROgram(s) HFA Inhaler 2 Puff(s) Inhalation every 6 hours PRN Bronchospasm  aluminum hydroxide/magnesium hydroxide/simethicone Suspension 30 milliLiter(s) Oral every 4 hours PRN Dyspepsia  ondansetron Injectable 4 milliGRAM(s) IV Push every 6 hours PRN Nausea and/or Vomiting           *****REVIEW OF SYSTEM:  GEN: no fever, no chills, no pain  RESP: no SOB, no cough, no sputum  CVS: no chest pain, no palpitations, no edema  GI: no abdominal pain, no nausea, no vomiting, no constipation, no diarrhea  : no dysurea, no frequency, no hematurea  Neuro: no headache, no dizziness  PSYCH: no anxiety, no depression  Derm : no itching, no rash         *****VITAL SIGNS:  T(C): 37 (10-23-20 @ 11:23), Max: 37.2 (10-23-20 @ 04:48)  HR: 63 (10-23-20 @ 11:23) (63 - 70)  BP: 132/79 (10-23-20 @ 11:23) (103/67 - 132/79)  RR: 18 (10-23-20 @ 11:23) (18 - 20)  SpO2: 95% (10-23-20 @ 11:23) (95% - 97%)  Wt(kg): --    10-22 @ 07:01  -  10-23 @ 07:00  --------------------------------------------------------  IN: 480 mL / OUT: 0 mL / NET: 480 mL    10-23 @ 07:01  -  10-23 @ 20:32  --------------------------------------------------------  IN: 240 mL / OUT: 0 mL / NET: 240 mL             *****PHYSICAL EXAM:   Alert oriented x 3   Attention comprehension are fair. Able to name, repeat, read without any difficulty.   Able to follow 3 step commands.     EOMI fundi not visualized,  VFF to confrontration  No facial asymmetry   Tongue is midline   Palate elevates symmetrically   Moving all 4 ext symmetrically no pronator drift   Reflexes are symmetric throughout   sensation is grossly symmetric  Gait : not assessed.  B/L down going toes               *****LAB AND IMAGIN.8   6.37  )-----------( 284      ( 23 Oct 2020 06:58 )             44.2               10-23    141  |  108  |  16  ----------------------------<  95  3.6   |  24  |  0.80    Ca    8.8      23 Oct 2020 06:59  Phos  2.8     10-  Mg     2.0     10-    TPro  6.1  /  Alb  3.3  /  TBili  0.4  /  DBili  x   /  AST  24  /  ALT  16  /  AlkPhos  73  10-                                [All pertinent recent Imaging reports reviewed]         *****A S S E S S M E N T   A N D   P L A N :    59 y/o F with PMH Pre-DM, HTN, Asthma presents to ED with multiple medical complaints. Patient endorses total weakness for 6.5 weeks. For the last 2 weeks patient has been having of intermittent lower abdominal cramping. Endorse nausea with dry heaving for the last week. Patient endorses improvement in abdominal discomfort with flatulence. Progressively patient states that weakness has increased that she cannot physically walk around her home. For the last two days also states that she had been having bright red blood in her stool. Last BM <24 hours ago, described as "soft and mushy." Occasional coughing at baseline 2/2 asthma. Was suppose to see a GI provider but states that she was too weak to make an appointment. No prior history of upper gastrointestinal endoscopy or colonoscopy.  (21 Oct 2020 17:23)           *****PAST MEDICAL / Surgical  HISTORY:     Excerpt from H&P," 59 y/o F with PMH Pre-DM, HTN, Asthma presents to ED with multiple medical complaints. Patient endorses total weakness for 6.5 weeks. For the last 2 weeks patient has been having of intermittent lower abdominal cramping. Endorse nausea with dry heaving for the last week. Patient endorses improvement in abdominal discomfort with flatulence. Progressively patient states that weakness has increased that she cannot physically walk around her home. For the last two days also states that she had been having bright red blood in her stool. Last BM <24 hours ago, described as "soft and mushy." Occasional coughing at baseline 2/2 asthma. Was suppose to see a GI provider but states that she was too weak to make an appointment. No prior history of upper gastrointestinal endoscopy or colonoscopy.    Problem/Recommendations 1:  weakness on standing of unclear etiology   likely due to orthostatic hypotension   compression stockings     unable to get mri due to body habitus  ct head   carotid duplex   tele monitoring     Problem/Recommendations 2: nausea/vomiting   ck a1c   tsh        ___________________________  Will follow with you.  Thank you,  Iraida Baird MD  Diplomate of the American Board of Neurology and Psychiatry.  Diplomate of the American Board of Vascular Neurology.   [facility] ([facility]), [facility]   Ph: [phone]    Differential diagnosis and plan of care discussed with patient after the evaluation.   Advanced care planning options discussed.   Pain assessed and judicious use of narcotics when appropriate was discussed.  Importance of Fall prevention discussed.  Counseling on Smoking and Alcohol cessation was offered when appropriate.  Counseling on Diet, exercise, and medication compliance was done.   83 minutes spent on the total encounter;  more than 50 % of the visit was spent on counseling  and or coordinating care by the attending physician.    Thank you for allowing me to participate in the care of this eulogio patient. Please do not hesitate to call me if you have any questions.     This and subsequent notes were partially created using voice recognition software and will  inherently be subject to errors including those of syntax and sound alike substitutions which may escape proofreading. In such instances original meaning may be extrapolated by contextual derivation.

## 2020-10-23 NOTE — PROVIDER CONTACT NOTE (MEDICATION) - SITUATION
AM orthostatic VS performed as ordered, UD=790/66 -> 111/78 from lying to standing position (see flowsheet). Patient symptomatic, "feels dizzy", and refusing AM anti-HTN medications.

## 2020-10-23 NOTE — PROGRESS NOTE ADULT - ASSESSMENT
Problem/Plan - 1:  ·  Problem: Abdominal pain.  Plan: CT a/p demonstrating colonic diverticulosis without diverticulitis, and a L posterolateral fatty mass  - do not suspect this as source of pain   - possibly related to excess gas as patient reports pain is intermittent in nature and improved with flatulence  - cw simethicone 80mg TID   - started high-fiber diet.   - no further GI murphy     Problem/Plan - 2:  ·  Problem: dizziness Plan: cards and neuro fu   will monitor     Problem/Plan - 3:  ·  Problem: Asthma.  Plan: cw albterol HFA.

## 2020-10-24 LAB
A1C WITH ESTIMATED AVERAGE GLUCOSE RESULT: 5.8 % — HIGH (ref 4–5.6)
ANION GAP SERPL CALC-SCNC: 9 MMOL/L — SIGNIFICANT CHANGE UP (ref 5–17)
BUN SERPL-MCNC: 16 MG/DL — SIGNIFICANT CHANGE UP (ref 7–23)
CALCIUM SERPL-MCNC: 8.8 MG/DL — SIGNIFICANT CHANGE UP (ref 8.4–10.5)
CHLORIDE SERPL-SCNC: 108 MMOL/L — SIGNIFICANT CHANGE UP (ref 96–108)
CO2 SERPL-SCNC: 24 MMOL/L — SIGNIFICANT CHANGE UP (ref 22–31)
CREAT SERPL-MCNC: 0.83 MG/DL — SIGNIFICANT CHANGE UP (ref 0.5–1.3)
ESTIMATED AVERAGE GLUCOSE: 120 MG/DL — HIGH (ref 68–114)
GLUCOSE SERPL-MCNC: 82 MG/DL — SIGNIFICANT CHANGE UP (ref 70–99)
HCT VFR BLD CALC: 45.1 % — HIGH (ref 34.5–45)
HGB BLD-MCNC: 13.7 G/DL — SIGNIFICANT CHANGE UP (ref 11.5–15.5)
MAGNESIUM SERPL-MCNC: 2.1 MG/DL — SIGNIFICANT CHANGE UP (ref 1.6–2.6)
MCHC RBC-ENTMCNC: 24.2 PG — LOW (ref 27–34)
MCHC RBC-ENTMCNC: 30.4 GM/DL — LOW (ref 32–36)
MCV RBC AUTO: 79.5 FL — LOW (ref 80–100)
NRBC # BLD: 0 /100 WBCS — SIGNIFICANT CHANGE UP (ref 0–0)
PLATELET # BLD AUTO: 283 K/UL — SIGNIFICANT CHANGE UP (ref 150–400)
POTASSIUM SERPL-MCNC: 3.8 MMOL/L — SIGNIFICANT CHANGE UP (ref 3.5–5.3)
POTASSIUM SERPL-SCNC: 3.8 MMOL/L — SIGNIFICANT CHANGE UP (ref 3.5–5.3)
RBC # BLD: 5.67 M/UL — HIGH (ref 3.8–5.2)
RBC # FLD: 15.2 % — HIGH (ref 10.3–14.5)
SODIUM SERPL-SCNC: 141 MMOL/L — SIGNIFICANT CHANGE UP (ref 135–145)
TSH SERPL-MCNC: 2.84 UIU/ML — SIGNIFICANT CHANGE UP (ref 0.27–4.2)
VIT B12 SERPL-MCNC: 248 PG/ML — SIGNIFICANT CHANGE UP (ref 232–1245)
WBC # BLD: 6.69 K/UL — SIGNIFICANT CHANGE UP (ref 3.8–10.5)
WBC # FLD AUTO: 6.69 K/UL — SIGNIFICANT CHANGE UP (ref 3.8–10.5)

## 2020-10-24 PROCEDURE — 70496 CT ANGIOGRAPHY HEAD: CPT | Mod: 26

## 2020-10-24 PROCEDURE — 70498 CT ANGIOGRAPHY NECK: CPT | Mod: 26

## 2020-10-24 RX ORDER — MECLIZINE HCL 12.5 MG
12.5 TABLET ORAL
Refills: 0 | Status: DISCONTINUED | OUTPATIENT
Start: 2020-10-24 | End: 2020-10-25

## 2020-10-24 RX ADMIN — SIMETHICONE 80 MILLIGRAM(S): 80 TABLET, CHEWABLE ORAL at 02:40

## 2020-10-24 RX ADMIN — Medication 12.5 MILLIGRAM(S): at 12:22

## 2020-10-24 NOTE — PROGRESS NOTE ADULT - SUBJECTIVE AND OBJECTIVE BOX
Patient is a 60y old  Female who presents with a chief complaint of dizziness (23 Oct 2020 15:14)      INTERVAL HPI/OVERNIGHT EVENTS:  T(C): 36.7 (10-24-20 @ 11:32), Max: 36.8 (10-23-20 @ 20:55)  HR: 74 (10-24-20 @ 11:32) (65 - 82)  BP: 122/79 (10-24-20 @ 11:32) (110/66 - 131/78)  RR: 18 (10-24-20 @ 11:32) (18 - 19)  SpO2: 96% (10-24-20 @ 11:32) (95% - 96%)  Wt(kg): --  I&O's Summary    23 Oct 2020 07:01  -  24 Oct 2020 07:00  --------------------------------------------------------  IN: 240 mL / OUT: 0 mL / NET: 240 mL        LABS:                        13.7   6.69  )-----------( 283      ( 24 Oct 2020 06:34 )             45.1     10-24    141  |  108  |  16  ----------------------------<  82  3.8   |  24  |  0.83    Ca    8.8      24 Oct 2020 06:34  Phos  2.8     10-23  Mg     2.1     10-24          CAPILLARY BLOOD GLUCOSE                MEDICATIONS  (STANDING):  influenza   Vaccine 0.5 milliLiter(s) IntraMuscular once  meclizine 12.5 milliGRAM(s) Oral two times a day  pantoprazole    Tablet 40 milliGRAM(s) Oral daily  simethicone 80 milliGRAM(s) Chew three times a day  sucralfate suspension 1 Gram(s) Oral two times a day    MEDICATIONS  (PRN):  ALBUTerol    90 MICROgram(s) HFA Inhaler 2 Puff(s) Inhalation every 6 hours PRN Bronchospasm  aluminum hydroxide/magnesium hydroxide/simethicone Suspension 30 milliLiter(s) Oral every 4 hours PRN Dyspepsia  ondansetron Injectable 4 milliGRAM(s) IV Push every 6 hours PRN Nausea and/or Vomiting          PHYSICAL EXAM:  GENERAL: NAD, well-groomed, well-developed  HEAD:  Atraumatic, Normocephalic  CHEST/LUNG: Clear to percussion bilaterally; No rales, rhonchi, wheezing, or rubs  HEART: Regular rate and rhythm; No murmurs, rubs, or gallops  ABDOMEN: Soft, Nontender, Nondistended; Bowel sounds present  EXTREMITIES:  2+ Peripheral Pulses, No clubbing, cyanosis, or edema  LYMPH: No lymphadenopathy noted  SKIN: No rashes or lesions    Care Discussed with Consultants/Other Providers [ ] YES  [ ] NO

## 2020-10-24 NOTE — PROGRESS NOTE ADULT - SUBJECTIVE AND OBJECTIVE BOX
[facility]      Seen earlier today, and examined.  - Today, patient is without complaints.           *****MEDICATIONS: Current medication reviewed and documented.    MEDICATIONS  (STANDING):  influenza   Vaccine 0.5 milliLiter(s) IntraMuscular once  meclizine 12.5 milliGRAM(s) Oral two times a day  pantoprazole    Tablet 40 milliGRAM(s) Oral daily  simethicone 80 milliGRAM(s) Chew three times a day  sucralfate suspension 1 Gram(s) Oral two times a day    MEDICATIONS  (PRN):  ALBUTerol    90 MICROgram(s) HFA Inhaler 2 Puff(s) Inhalation every 6 hours PRN Bronchospasm  aluminum hydroxide/magnesium hydroxide/simethicone Suspension 30 milliLiter(s) Oral every 4 hours PRN Dyspepsia  ondansetron Injectable 4 milliGRAM(s) IV Push every 6 hours PRN Nausea and/or Vomiting          ***** VITAL SIGNS:  T(F): 98.1 (10-24-20 @ 11:32), Max: 98.2 (10-23-20 @ 20:55)  HR: 74 (10-24-20 @ 11:32) (65 - 82)  BP: 122/79 (10-24-20 @ 11:32) (110/66 - 131/78)  RR: 18 (10-24-20 @ 11:32) (18 - 19)  SpO2: 96% (10-24-20 @ 11:32) (95% - 96%)  Wt(kg): --  ,   I&O's Summary    23 Oct 2020 07:01  -  24 Oct 2020 07:00  --------------------------------------------------------  IN: 240 mL / OUT: 0 mL / NET: 240 mL             *****PHYSICAL EXAM:   alert oriented x 3 attention comprehension are fair.  Able to name, repeat.   EOmi fundi not visualized   no nystagmus VFF to confrontation  Tongue is midline  Palate elevates symmetrically   Moving all 4 ext spontaneously no drift appreciated    Gait not assessed.            *****LAB AND IMAGIN.7   6.69  )-----------( 283      ( 24 Oct 2020 06:34 )             45.1               10-24    141  |  108  |  16  ----------------------------<  82  3.8   |  24  |  0.83    Ca    8.8      24 Oct 2020 06:34  Phos  2.8     10-23  Mg     2.1     10-24           aA1C with Estimated Average Glucose in AM (10.24.20 @ 11:23)   A1C with Estimated Average Glucose Result: 5.8: Method: Immunoassay                 [All pertinent recent Imaging/Reports reviewed]           *****A S S E S S M E N T   A N D   P L A N :      Excerpt from H&P," 61 y/o F with PMH Pre-DM, HTN, Asthma presents to ED with multiple medical complaints. Patient endorses total weakness for 6.5 weeks. For the last 2 weeks patient has been having of intermittent lower abdominal cramping. Endorse nausea with dry heaving for the last week. Patient endorses improvement in abdominal discomfort with flatulence. Progressively patient states that weakness has increased that she cannot physically walk around her home. For the last two days also states that she had been having bright red blood in her stool. Last BM <24 hours ago, described as "soft and mushy." Occasional coughing at baseline 2/2 asthma. Was suppose to see a GI provider but states that she was too weak to make an appointment. No prior history of upper gastrointestinal endoscopy or colonoscopy.        Problem/Recommendations 1:  weakness on standing of unclear etiology   likely due to orthostatic hypotension +/- spinal stenosis related neurogenic claudication   compression stockings     unable to get mri due to body habitus  ct head R cerebellar infarct remote   cta head and neck  to r/o vertebrobasilar insuff   tele monitoring     Problem/Recommendations 2: nausea dry heaving + tinnitus   ent eval for bppv        ck a1c 5.8   tsh   meclizine 12.5 bid     Thank you for allowing me to participate in the care of this patient. Please do not hesitate to call me if you have any  questions.        ________________  Iraida Baird MD  [facility] ([facility]  [phone]      33 minutes spent on total encounter; more than 50 % of the visit was  spent counseling about plan of care, compliance to diet/exercise and medication regimen and or  coordinating care by the attending physician.      It is advised that stroke patients follow up with BRIDGER Patterson @ [phone] in 1- 2 weeks.   Others please follow up with Dr. Michael Nissenbaum [phone] You can access the FollowMyHealth Patient Portal offered by [facility] by registering at the following website: [email or web address]. By joining [employer]’s FollowMyHealth portal, you will also be able to view your health information using other applications (apps) compatible with our system.

## 2020-10-24 NOTE — PROGRESS NOTE ADULT - ASSESSMENT
orthostatic hypotension  abdominal pain    CT negative   doubt gi pathology  cardiac workup ongoing  BP meds dc'd per cardiology   tele monitoring  echo showed preserved LV function, bordeline PHTN  CTH noted  no immediate plan for GI workup  d/w GI attending  will follow closely with you    Advanced care planning was discussed with patient and family.  Advanced care planning forms were reviewed and discussed.  Risks, benefits and alternatives of gastroenterologic procedures were discussed in detail and all questions were answered.    30 minutes spent.

## 2020-10-24 NOTE — PROGRESS NOTE ADULT - SUBJECTIVE AND OBJECTIVE BOX
GASTROENTEROLOGY    Patient seen and examined at bedside  OOB to chair  c/o nausea that is worse with standing  (+) dry heaves  She denies abdominal pain  Tolerating diet  No BM so far today      MEDICATIONS  (STANDING):  influenza   Vaccine 0.5 milliLiter(s) IntraMuscular once  meclizine 12.5 milliGRAM(s) Oral two times a day  pantoprazole    Tablet 40 milliGRAM(s) Oral daily  simethicone 80 milliGRAM(s) Chew three times a day  sucralfate suspension 1 Gram(s) Oral two times a day        T(F): 98.1 (10-24-20 @ 11:32), Max: 98.2 (10-23-20 @ 20:55)  HR: 74 (10-24-20 @ 11:32) (65 - 82)  BP: 122/79 (10-24-20 @ 11:32) (110/66 - 131/78)  RR: 18 (10-24-20 @ 11:32) (18 - 19)  SpO2: 96% (10-24-20 @ 11:32) (95% - 96%)  Wt(kg): --    PHYSICAL EXAM  GENERAL:   NAD  HEENT:  NC/AT  CHEST:  clear to ascultation bilaterally, respirations nonlabored  HEART:  +S1+S2   ABDOMEN:  Soft, non-tender, non-distended, obese,  (+) bowel sounds  EXTREMITIES:  no cyanosis, clubbing or edema  NEURO:  Alert, oriented  SKIN:  No rash/warm/dry      LABS:                        13.7   6.69  )-----------( 283      ( 24 Oct 2020 06:34 )             45.1<H>  24 Oct 2020 06:34    10-24    141  |  108  |  16  ----------------------------<  82  3.8   |  24  |  0.83    Ca    8.8      24 Oct 2020 06:34  Phos  2.8     10-23  Mg     2.1     10-24          I&O's Detail    23 Oct 2020 07:01  -  24 Oct 2020 07:00  --------------------------------------------------------  IN:    Oral Fluid: 240 mL  Total IN: 240 mL    OUT:  Total OUT: 0 mL    Total NET: 240 mL          < from: Transthoracic Echocardiogram (10.23.20 @ 19:23) >  Patient name: ASHLEY THOMAS  YOB: 1960   Age: 60 (F)   MR#: [id number]  Study Date: 10/23/2020  [facility]  Study quality: Technically fair  Referring Physician: Desmond Miles MD  Blood Pressure: 132/79 mmHg  Height: 178 cm  Weight: 146 kg  BSA: 2.6 m2  ------------------------------------------------------------------------  PROCEDURE: Transthoracic echocardiogram with 2-D, M-Mode  and complete spectral and color flow Doppler.  INDICATION: Chest pain, unspecified (R07.9)  ------------------------------------------------------------------------  Dimensions:    Normal Values:  LA:     3.0    2.0 - 4.0 cm  Ao:     3.6    2.0 - 3.8 cm  SEPTUM: 1.0    0.6 - 1.2 cm  PWT:    1.0 0.6 - 1.1 cm  LVIDd:  4.5    3.0 - 5.6 cm  LVIDs:  3.0    1.8 - 4.0 cm  Derived variables:  LVMI: 61 g/m2  RWT: 0.44  Fractional short: 33 %  ------------------------------------------------------------------------  Observations:  Mitral Valve: Normal mitral valve. Minimal mitral  regurgitation.  Aortic Valve/Aorta: Sclerotic aortic valve leaflets with  normal opening. Minimal aortic regurgitation.  Aortic Root: 3.6 cm.  Left Atrium: Normal left atrium.  LA volume index = 29  cc/m2.  Left Ventricle: Endocardium not well visualized; grossly  preserved  left ventricular systolic function. Increased  relative wall thickness with normal left ventricular mass  index, consistent with concentric left ventricular  remodeling. Normal diastolic function  Right Heart: Normal right atrium. The right ventricle is  not well visualized; grossly normal right ventricular  systolic function. Normal tricuspid valve. Minimal  tricuspid regurgitation. Pulmonic valve not well  visualized.  Pericardium/Pleura:Normal pericardium with no pericardial  effusion.  Hemodynamic: Estimated right atrial pressure is 8 mm Hg.  Estimated right ventricular systolic pressure equals 35 mm  Hg, assuming right atrial pressure equals 8 mm Hg,  consistent with borderline pulmonary hypertension.  ------------------------------------------------------------------------  Conclusions:  1. Increased relative wall thickness with normal left  ventricular mass index, consistent with concentric left  ventricular remodeling.  2. Endocardium not well visualized; grossly preserved  left  ventricular systolic function.  3. The right ventricle is not well visualized; grossly  normal right ventricular systolic function.  *** Compared with echocardiogram of 7/22/2013, no  significant changes noted.  ------------------------------------------------------------------------  Confirmed on  10/24/2020 - 13:23:42 by Jt Mcintyre M.D.  ------------------------------------------------------------------------      < from: CT Head No Cont (10.23.20 @ 22:34) >    INTERPRETATION:  CLINICAL Indications:  weakness on standing    COMPARISON: CT head dated 10/7/2014    TECHNIQUE: Noncontrast CT of the head. Multiplanar reformationsare submitted.    FINDINGS: Chronic appearing right cerebellar hemisphere wedge-shaped infarct on image 9, series 6.  There is periventricular and subcortical white matter hypodensity without mass effect, nonspecific, likely representing minimal chronic microvascular ischemic changes. There is no compelling evidence for an acute transcortical infarction. There is no evidence of mass, mass effect, midline shift or extra-axial fluid collection. The lateral ventricles and cortical sulci are age-appropriate in size and configuration. The orbits, mastoid air cells and visualized paranasal sinuses are unremarkable. The calvarium is intact.    IMPRESSION:  Chronic right cerebral hemisphere infarct. Minimal chronic microvascular changes without evidence of an acute transcortical infarction or hemorrhage. Consider MRI as clinically warranted.  -----------------------------------------------------------------

## 2020-10-25 ENCOUNTER — TRANSCRIPTION ENCOUNTER (OUTPATIENT)
Age: 60
End: 2020-10-25

## 2020-10-25 VITALS
SYSTOLIC BLOOD PRESSURE: 145 MMHG | TEMPERATURE: 99 F | OXYGEN SATURATION: 95 % | HEART RATE: 71 BPM | RESPIRATION RATE: 18 BRPM | DIASTOLIC BLOOD PRESSURE: 83 MMHG

## 2020-10-25 PROCEDURE — 85018 HEMOGLOBIN: CPT

## 2020-10-25 PROCEDURE — 84484 ASSAY OF TROPONIN QUANT: CPT

## 2020-10-25 PROCEDURE — 80053 COMPREHEN METABOLIC PANEL: CPT

## 2020-10-25 PROCEDURE — 96374 THER/PROPH/DIAG INJ IV PUSH: CPT

## 2020-10-25 PROCEDURE — 70496 CT ANGIOGRAPHY HEAD: CPT

## 2020-10-25 PROCEDURE — 86901 BLOOD TYPING SEROLOGIC RH(D): CPT

## 2020-10-25 PROCEDURE — 82565 ASSAY OF CREATININE: CPT

## 2020-10-25 PROCEDURE — 86850 RBC ANTIBODY SCREEN: CPT

## 2020-10-25 PROCEDURE — 83605 ASSAY OF LACTIC ACID: CPT

## 2020-10-25 PROCEDURE — 74177 CT ABD & PELVIS W/CONTRAST: CPT

## 2020-10-25 PROCEDURE — 81001 URINALYSIS AUTO W/SCOPE: CPT

## 2020-10-25 PROCEDURE — U0003: CPT

## 2020-10-25 PROCEDURE — 83690 ASSAY OF LIPASE: CPT

## 2020-10-25 PROCEDURE — 86803 HEPATITIS C AB TEST: CPT

## 2020-10-25 PROCEDURE — 85027 COMPLETE CBC AUTOMATED: CPT

## 2020-10-25 PROCEDURE — 70450 CT HEAD/BRAIN W/O DYE: CPT

## 2020-10-25 PROCEDURE — 83735 ASSAY OF MAGNESIUM: CPT

## 2020-10-25 PROCEDURE — 85014 HEMATOCRIT: CPT

## 2020-10-25 PROCEDURE — 82330 ASSAY OF CALCIUM: CPT

## 2020-10-25 PROCEDURE — 85730 THROMBOPLASTIN TIME PARTIAL: CPT

## 2020-10-25 PROCEDURE — 83036 HEMOGLOBIN GLYCOSYLATED A1C: CPT

## 2020-10-25 PROCEDURE — 84295 ASSAY OF SERUM SODIUM: CPT

## 2020-10-25 PROCEDURE — 97116 GAIT TRAINING THERAPY: CPT

## 2020-10-25 PROCEDURE — 96375 TX/PRO/DX INJ NEW DRUG ADDON: CPT

## 2020-10-25 PROCEDURE — 82272 OCCULT BLD FECES 1-3 TESTS: CPT

## 2020-10-25 PROCEDURE — 82947 ASSAY GLUCOSE BLOOD QUANT: CPT

## 2020-10-25 PROCEDURE — 70498 CT ANGIOGRAPHY NECK: CPT

## 2020-10-25 PROCEDURE — 99285 EMERGENCY DEPT VISIT HI MDM: CPT | Mod: 25

## 2020-10-25 PROCEDURE — 87086 URINE CULTURE/COLONY COUNT: CPT

## 2020-10-25 PROCEDURE — 85025 COMPLETE CBC W/AUTO DIFF WBC: CPT

## 2020-10-25 PROCEDURE — 86769 SARS-COV-2 COVID-19 ANTIBODY: CPT

## 2020-10-25 PROCEDURE — 86900 BLOOD TYPING SEROLOGIC ABO: CPT

## 2020-10-25 PROCEDURE — 84443 ASSAY THYROID STIM HORMONE: CPT

## 2020-10-25 PROCEDURE — 84132 ASSAY OF SERUM POTASSIUM: CPT

## 2020-10-25 PROCEDURE — 97161 PT EVAL LOW COMPLEX 20 MIN: CPT

## 2020-10-25 PROCEDURE — 93306 TTE W/DOPPLER COMPLETE: CPT

## 2020-10-25 PROCEDURE — 84100 ASSAY OF PHOSPHORUS: CPT

## 2020-10-25 PROCEDURE — 93005 ELECTROCARDIOGRAM TRACING: CPT

## 2020-10-25 PROCEDURE — 80048 BASIC METABOLIC PNL TOTAL CA: CPT

## 2020-10-25 PROCEDURE — 85610 PROTHROMBIN TIME: CPT

## 2020-10-25 PROCEDURE — 82803 BLOOD GASES ANY COMBINATION: CPT

## 2020-10-25 PROCEDURE — 82607 VITAMIN B-12: CPT

## 2020-10-25 PROCEDURE — 86703 HIV-1/HIV-2 1 RESULT ANTBDY: CPT

## 2020-10-25 PROCEDURE — 71045 X-RAY EXAM CHEST 1 VIEW: CPT

## 2020-10-25 PROCEDURE — 72131 CT LUMBAR SPINE W/O DYE: CPT

## 2020-10-25 PROCEDURE — 82435 ASSAY OF BLOOD CHLORIDE: CPT

## 2020-10-25 RX ORDER — PREGABALIN 225 MG/1
1000 CAPSULE ORAL DAILY
Refills: 0 | Status: DISCONTINUED | OUTPATIENT
Start: 2020-10-25 | End: 2020-10-25

## 2020-10-25 RX ORDER — AMLODIPINE BESYLATE 2.5 MG/1
2 TABLET ORAL
Qty: 0 | Refills: 0 | DISCHARGE

## 2020-10-25 RX ORDER — LISINOPRIL 2.5 MG/1
2 TABLET ORAL
Qty: 0 | Refills: 0 | DISCHARGE

## 2020-10-25 RX ORDER — MECLIZINE HCL 12.5 MG
1 TABLET ORAL
Qty: 30 | Refills: 0
Start: 2020-10-25 | End: 2020-11-08

## 2020-10-25 RX ORDER — SIMETHICONE 80 MG/1
1 TABLET, CHEWABLE ORAL
Qty: 90 | Refills: 0
Start: 2020-10-25 | End: 2020-11-23

## 2020-10-25 RX ORDER — METOPROLOL TARTRATE 50 MG
1 TABLET ORAL
Qty: 0 | Refills: 0 | DISCHARGE

## 2020-10-25 RX ORDER — METOCLOPRAMIDE HCL 10 MG
1 TABLET ORAL
Qty: 0 | Refills: 0 | DISCHARGE

## 2020-10-25 RX ORDER — ONDANSETRON 8 MG/1
1 TABLET, FILM COATED ORAL
Qty: 0 | Refills: 0 | DISCHARGE

## 2020-10-25 RX ORDER — PANTOPRAZOLE SODIUM 20 MG/1
1 TABLET, DELAYED RELEASE ORAL
Qty: 30 | Refills: 0
Start: 2020-10-25 | End: 2020-11-23

## 2020-10-25 RX ORDER — SUCRALFATE 1 G
10 TABLET ORAL
Qty: 600 | Refills: 0
Start: 2020-10-25 | End: 2020-11-23

## 2020-10-25 RX ORDER — OMEPRAZOLE 10 MG/1
1 CAPSULE, DELAYED RELEASE ORAL
Qty: 0 | Refills: 0 | DISCHARGE

## 2020-10-25 RX ADMIN — PREGABALIN 1000 MICROGRAM(S): 225 CAPSULE ORAL at 14:16

## 2020-10-25 NOTE — DISCHARGE NOTE PROVIDER - CARE PROVIDERS DIRECT ADDRESSES
,DirectAddress_Unknown,[email or web address],DirectAddress_Unknown,DirectAddress_Unknown

## 2020-10-25 NOTE — DISCHARGE NOTE NURSING/CASE MANAGEMENT/SOCIAL WORK - PATIENT PORTAL LINK FT
You can access the FollowMyHealth Patient Portal offered by [facility] by registering at the following website: [email or web address]. By joining [employer]’s FollowMyHealth portal, you will also be able to view your health information using other applications (apps) compatible with our system.

## 2020-10-25 NOTE — PROGRESS NOTE ADULT - SUBJECTIVE AND OBJECTIVE BOX
pt seen and examined, no complaints, ROS - .          ALBUTerol    90 MICROgram(s) HFA Inhaler 2 Puff(s) Inhalation every 6 hours PRN  aluminum hydroxide/magnesium hydroxide/simethicone Suspension 30 milliLiter(s) Oral every 4 hours PRN  influenza   Vaccine 0.5 milliLiter(s) IntraMuscular once  meclizine 12.5 milliGRAM(s) Oral two times a day  ondansetron Injectable 4 milliGRAM(s) IV Push every 6 hours PRN  pantoprazole    Tablet 40 milliGRAM(s) Oral daily  simethicone 80 milliGRAM(s) Chew three times a day  sucralfate suspension 1 Gram(s) Oral two times a day                            13.7   6.69  )-----------( 283      ( 24 Oct 2020 06:34 )             45.1       Hemoglobin: 13.7 g/dL (10-24 @ 06:34)  Hemoglobin: 13.8 g/dL (10-23 @ 06:58)  Hemoglobin: 14.2 g/dL (10-22 @ 06:17)  Hemoglobin: 16.1 g/dL (10-21 @ 06:23)      10-24    141  |  108  |  16  ----------------------------<  82  3.8   |  24  |  0.83    Ca    8.8      24 Oct 2020 06:34  Mg     2.1     10-24      Creatinine Trend: 0.83<--, 0.80<--, 0.91<--, 0.99<--    COAGS:           T(C): 36.6 (10-25-20 @ 04:41), Max: 36.8 (10-24-20 @ 20:37)  HR: 63 (10-25-20 @ 04:41) (63 - 74)  BP: 118/77 (10-25-20 @ 04:41) (114/77 - 122/79)  RR: 18 (10-25-20 @ 04:41) (18 - 18)  SpO2: 95% (10-25-20 @ 04:41) (95% - 96%)  Wt(kg): --    I&O's Summary    24 Oct 2020 07:01  -  25 Oct 2020 07:00  --------------------------------------------------------  IN: 240 mL / OUT: 0 mL / NET: 240 mL        Gen: Appears well in NAD  HEENT:  (-)icterus (-)pallor  CV: N S1 S2 1/6 YEVGENIY (+)2 Pulses B/l  Resp:  Clear to auscultation B/L, normal effort  GI: (+) BS Soft, NT, ND  Lymph:  (-)Edema, (-)obvious lymphadenopathy  Skin: Warm to touch, Normal turgor  Psych: Appropriate mood and affect      TELEMETRY: NSR 60-90    ECG:  NSR w/ APC's.    < from: Transthoracic Echocardiogram (10.23.20 @ 19:23) >  Conclusions:  1. Increased relative wall thickness with normal left  ventricular mass index, consistent with concentric left  ventricular remodeling.  2. Endocardium not well visualized; grossly preserved  left  ventricular systolic function.  3. The right ventricle is not well visualized; grossly  normal right ventricular systolic function.  *** Compared with echocardiogram of 7/22/2013, no  significant changes noted.    < end of copied text >    ASSESSMENT/PLAN: 60y Female with PMH of HTN and Asthma who presents with insidious onset of shortness of breath and pre-syncope.  NYHA IIIB functional status, and is only comfortable at rest.  Has daily episodes of nausea and dry-heaving without vomiting.  Presented to hospital out of frustration for inability to take care of herself at home. Has seen an [facility] Cardiologist ~7 yrs ago. Was told she has Mitral Valve Prolapse, but is not aware of any heart failure or arrhythmia syndromes that go with it. States she had an echo and stress test many years ago. Has had no reason to return for cardiovascular care. No hx of MI, CHF, or arrhythmia.  Has orthopnea. Prefers to sleep in a recliner.  	  - MI ruled out, no sign of ACS  - Asthma diagnosis is in question.  Consider pulmonology consultation.  - Monitor on telemetry, replace K to 4, Mg to 2.  - will check orthostatics VS today , cont stocking   - Hold all antihypertensives at this time - monitor BP, can reintroduce as needed  - GI follow up noted  - TSH WNL, A1c 5.8 (prediabetic)  - Neurology follow up appreciated - CT head with chronic right cerebral hemisphere infarct, f/u CTA head/neck, trial of meclizine  - TTE noted above with normal LV/RV function, no sig valvular disease

## 2020-10-25 NOTE — DISCHARGE NOTE PROVIDER - HOSPITAL COURSE
60y Female with PMH of HTN and Asthma who presents with insidious onset of shortness of breath and pre-syncope. NYHA IIIB functional status, and is only comfortable at rest. Has daily episodes of nausea and dry-heaving without vomiting. Presented to hospital out of frustration for inability to take care of herself at home. Has seen an [facility] Cardiologist ~7 yrs ago. Was told she has Mitral Valve Prolapse, but is not aware of any heart failure or arrhythmia syndromes that go with it. MI ruled out, no sign of ACS. TTE noted with normal LV/RV function, no sig valvular disease. Noted (+) orthostatic hypotension which improved with IVF. Neuro consulted for further evaluation of weakness, dizziness. CT head with chronic right cerebral hemisphere infarct. CTA head/neck with no evidence of vertebral insufficiency. Recommend outpatient ENT evaluation. GI consulted for abdominal pain/nausea. CT a/p demonstrating colonic diverticulosis without diverticulitis, and a L posterolateral fatty mass. Abdominal pain likely related to excess gas as patient reports pain is intermittent in nature and improved with flatulence. Continue with simethicone and high fiber diet. Follow up with Dr Anderson as outpatient.   Pt stable for discharge home with PCP/GI/ENT follow up.

## 2020-10-25 NOTE — PROGRESS NOTE ADULT - ASSESSMENT
orthostatic hypotension  abdominal pain    CT negative   doubt gi pathology  cardiac workup ongoing  BP meds dc'd per cardiology   tele monitoring  check orthstatics  echo showed preserved LV function, borderline  PHTN  CTH noted  no immediate plan for GI workup  d/w GI attending  will follow closely with you    Advanced care planning was discussed with patient and family.  Advanced care planning forms were reviewed and discussed.  Risks, benefits and alternatives of gastroenterologic procedures were discussed in detail and all questions were answered.    30 minutes spent.

## 2020-10-25 NOTE — PROGRESS NOTE ADULT - ASSESSMENT
Problem/Plan - 1:  ·  Problem: Abdominal pain.  Plan: CT a/p demonstrating colonic diverticulosis without diverticulitis, and a L posterolateral fatty mass  - do not suspect this as source of pain   - possibly related to excess gas as patient reports pain is intermittent in nature and improved with flatulence  - cw simethicone 80mg TID   - started high-fiber diet.   - no further GI murphy     Problem/Plan - 2:  ·  Problem: dizziness Plan: cards and neuro fu   will monitor   ct head -ve  ent as outpt     Problem/Plan - 3:  ·  Problem: Asthma.  Plan: cw albterol HFA.     dc planning today , ot refusing to be discharged

## 2020-10-25 NOTE — DISCHARGE NOTE PROVIDER - NSDCCPCAREPLAN_GEN_ALL_CORE_FT
PRINCIPAL DISCHARGE DIAGNOSIS  Diagnosis: Dizziness  Assessment and Plan of Treatment:   Myocardial infarction ruled out, no sign of Acute Coronary Syndrome. TTE noted with normal LV/RV function, no significant valvular disease. Noted (+) orthostatic hypotension which improved with IV hydration.   CT head with chronic right cerebral hemisphere infarct.   CTA head/neck with no evidence of vertebral insufficiency.   Recommend outpatient ENT evaluation.      SECONDARY DISCHARGE DIAGNOSES  Diagnosis: Hypertension  Assessment and Plan of Treatment: Continue to hold all antihypertensives at this time.  Follow up with PCP Dr Jame Walter within 1 week to monitor BP and resume medications if needed.    Diagnosis: Nausea & vomiting  Assessment and Plan of Treatment: CT Abdomen/Pelvis demonstrating colonic diverticulosis without diverticulitis, and a Left posterolateral fatty mass.   Abdominal pain likely related to excess gas as patient reports pain is intermittent in nature and improved with flatulence. Continue with simethicone and high fiber diet. Follow up with Dr Anderson as outpatient.

## 2020-10-25 NOTE — PROGRESS NOTE ADULT - SUBJECTIVE AND OBJECTIVE BOX
Patient is a 60y old  Female who presents with a chief complaint of dizziness (24 Oct 2020 17:43)      INTERVAL HPI/OVERNIGHT EVENTS:  T(C): 37.3 (10-25-20 @ 11:34), Max: 37.3 (10-25-20 @ 11:34)  HR: 71 (10-25-20 @ 11:34) (63 - 72)  BP: 145/83 (10-25-20 @ 11:34) (114/77 - 145/83)  RR: 18 (10-25-20 @ 11:34) (18 - 18)  SpO2: 95% (10-25-20 @ 11:34) (95% - 95%)  Wt(kg): --  I&O's Summary    24 Oct 2020 07:01  -  25 Oct 2020 07:00  --------------------------------------------------------  IN: 240 mL / OUT: 0 mL / NET: 240 mL        LABS:                        13.7   6.69  )-----------( 283      ( 24 Oct 2020 06:34 )             45.1     10-24    141  |  108  |  16  ----------------------------<  82  3.8   |  24  |  0.83    Ca    8.8      24 Oct 2020 06:34  Mg     2.1     10-24          CAPILLARY BLOOD GLUCOSE                MEDICATIONS  (STANDING):  cyanocobalamin Injectable 1000 MICROGram(s) SubCutaneous daily  influenza   Vaccine 0.5 milliLiter(s) IntraMuscular once  meclizine 12.5 milliGRAM(s) Oral two times a day  pantoprazole    Tablet 40 milliGRAM(s) Oral daily  simethicone 80 milliGRAM(s) Chew three times a day  sucralfate suspension 1 Gram(s) Oral two times a day    MEDICATIONS  (PRN):  ALBUTerol    90 MICROgram(s) HFA Inhaler 2 Puff(s) Inhalation every 6 hours PRN Bronchospasm  aluminum hydroxide/magnesium hydroxide/simethicone Suspension 30 milliLiter(s) Oral every 4 hours PRN Dyspepsia  ondansetron Injectable 4 milliGRAM(s) IV Push every 6 hours PRN Nausea and/or Vomiting          PHYSICAL EXAM:  GENERAL: NAD, well-groomed, well-developed  HEAD:  Atraumatic, Normocephalic  CHEST/LUNG: Clear to percussion bilaterally; No rales, rhonchi, wheezing, or rubs  HEART: Regular rate and rhythm; No murmurs, rubs, or gallops  ABDOMEN: Soft, Nontender, Nondistended; Bowel sounds present  EXTREMITIES:  2+ Peripheral Pulses, No clubbing, cyanosis, or edema  LYMPH: No lymphadenopathy noted  SKIN: No rashes or lesions    Care Discussed with Consultants/Other Providers [ ] YES  [ ] NO

## 2020-10-25 NOTE — PROGRESS NOTE ADULT - SUBJECTIVE AND OBJECTIVE BOX
GASTROENTEROLOGY    Patient seen and examined at bedside  Still with persistent nausea exacerbated by standing/ambulating  Tolerating diet      MEDICATIONS  (STANDING):  cyanocobalamin Injectable 1000 MICROGram(s) SubCutaneous daily  influenza   Vaccine 0.5 milliLiter(s) IntraMuscular once  meclizine 12.5 milliGRAM(s) Oral two times a day  pantoprazole    Tablet 40 milliGRAM(s) Oral daily  simethicone 80 milliGRAM(s) Chew three times a day  sucralfate suspension 1 Gram(s) Oral two times a day        T(F): 99.2 (10-25-20 @ 11:34), Max: 99.2 (10-25-20 @ 11:34)  HR: 71 (10-25-20 @ 11:34) (63 - 72)  BP: 145/83 (10-25-20 @ 11:34) (114/77 - 145/83)  RR: 18 (10-25-20 @ 11:34) (18 - 18)  SpO2: 95% (10-25-20 @ 11:34) (95% - 95%)  Wt(kg): --    PHYSICAL EXAM  GENERAL:   NAD  HEENT:  NC/AT  CHEST:  clear to ascultation bilaterally, respirations nonlabored  HEART:  +S1+S2   ABDOMEN:  Soft, non-tender, non-distended, (+) bowel sounds  EXTREMITIES:  no cyanosis, clubbing or edema  NEURO:  Alert, oriented  SKIN:  No rash/warm/dry      LABS:    10-24    141  |  108  |  16  ----------------------------<  82  3.8   |  24  |  0.83    Ca    8.8      24 Oct 2020 06:34  Mg     2.1     10-24          I&O's Detail    24 Oct 2020 07:01  -  25 Oct 2020 07:00  --------------------------------------------------------  IN:    Oral Fluid: 240 mL  Total IN: 240 mL    OUT:  Total OUT: 0 mL    Total NET: 240 mL        Vitamin B12, Serum: 248 pg/mL (10-24 @ 11:39)

## 2020-10-25 NOTE — DISCHARGE NOTE PROVIDER - CARE PROVIDER_API CALL
Samuel Anderson (DO)  Gastroenterology; Internal Medicine  [address] [place] [address]  Phone: [phone]  Fax: [phone]  Established Patient  Follow Up Time: 1 week    Olegario Huerta  OTOLARYNGOLOGY  [address] [place] [address] [address]  [place] [address]  Phone: [phone]  Fax: [phone]  Established Patient  Follow Up Time: 1 week    Iraida Baird  NEUROLOGY  [address] [place] [address]  Phone: [phone]  Fax: [phone]  Established Patient  Follow Up Time: Routine    Saba Kilgore  [address] [address] [place] [address]  Phone: [phone]  Fax: (   )    -  Follow Up Time:

## 2020-10-25 NOTE — DISCHARGE NOTE PROVIDER - PROVIDER TOKENS
PROVIDER:[TOKEN:[[id number]],FOLLOWUP:[1 week],ESTABLISHEDPATIENT:[T]],PROVIDER:[TOKEN:[[id number]],FOLLOWUP:[1 week],ESTABLISHEDPATIENT:[T]],PROVIDER:[TOKEN:[[id number]],FOLLOWUP:[Routine],ESTABLISHEDPATIENT:[T]],FREE:[LAST:[Jame],FIRST:[Saba],PHONE:[phone]],FAX:[(   )    -],ADDRESS:[[address] [place] [address]]]

## 2020-10-25 NOTE — DISCHARGE NOTE PROVIDER - NSDCMRMEDTOKEN_GEN_ALL_CORE_FT
albuterol 90 mcg/inh inhalation aerosol: as needed  aluminum hydroxide-magnesium hydroxide 200 mg-200 mg/5 mL oral suspension: 30 milliliter(s) orally every 4 hours, As needed, Dyspepsia  meclizine 12.5 mg oral tablet: 1 tab(s) orally 2 times a day  pantoprazole 40 mg oral delayed release tablet: 1 tab(s) orally once a day  simethicone 80 mg oral tablet, chewable: 1 tab(s) orally 3 times a day  sucralfate 1 g/10 mL oral suspension: 10 milliliter(s) orally 2 times a day

## 2020-12-15 PROBLEM — Z01.419 ENCOUNTER FOR ANNUAL ROUTINE GYNECOLOGICAL EXAMINATION: Status: RESOLVED | Noted: 2017-06-20 | Resolved: 2020-12-15

## 2021-05-09 NOTE — ED ADULT TRIAGE NOTE - TEMPERATURE IN FAHRENHEIT (DEGREES F)
c/o r knee pain, and decreased ROM of R knee, since friday/ was seen at [place], was given his gout medication, pain improved, however worsened again.
98.3

## 2022-09-15 ENCOUNTER — NON-APPOINTMENT (OUTPATIENT)
Age: 62
End: 2022-09-15

## 2023-02-23 NOTE — H&P ADULT - PROBLEM SELECTOR PLAN 1
CT a/p demonstrating colonic diverticulosis without diverticulitis, and a L posterolateral fatty mass  - do not suspect this as source of pain   - possibly related to excess gas as patient reports pain is intermittent in nature and improved with flatulence  - will start simethicone 80mg TID   - started high-fiber diet Peng Advancement Flap Text: The defect edges were debeveled with a #15 scalpel blade. Given the location of the defect, shape of the defect and the proximity to free margins a Peng advancement flap was deemed most appropriate. Using a sterile surgical marker, an appropriate advancement flap was drawn incorporating the defect and placing the expected incisions within the relaxed skin tension lines where possible. The area thus outlined was incised deep to adipose tissue with a #15 scalpel blade. The skin margins were undermined to an appropriate distance in all directions utilizing iris scissors. Following this, the designed flap was advanced into the primary defect and sutured into place.

## 2023-09-01 NOTE — PROGRESS NOTE ADULT - SUBJECTIVE AND OBJECTIVE BOX
- SSI and accuchecks     S: Still reports lightheadedness/weakness and nausea. Denies chest pain or SOB at rest. Review of systems otherwise (-)      MEDICATIONS  (STANDING):  influenza   Vaccine 0.5 milliLiter(s) IntraMuscular once  meclizine 12.5 milliGRAM(s) Oral two times a day  pantoprazole    Tablet 40 milliGRAM(s) Oral daily  simethicone 80 milliGRAM(s) Chew three times a day  sucralfate suspension 1 Gram(s) Oral two times a day    MEDICATIONS  (PRN):  ALBUTerol    90 MICROgram(s) HFA Inhaler 2 Puff(s) Inhalation every 6 hours PRN Bronchospasm  aluminum hydroxide/magnesium hydroxide/simethicone Suspension 30 milliLiter(s) Oral every 4 hours PRN Dyspepsia  ondansetron Injectable 4 milliGRAM(s) IV Push every 6 hours PRN Nausea and/or Vomiting      LABS:                            13.7   6.69  )-----------( 283      ( 24 Oct 2020 06:34 )             45.1     Hemoglobin: 13.7 g/dL (10-24 @ 06:34)  Hemoglobin: 13.8 g/dL (10-23 @ 06:58)  Hemoglobin: 14.2 g/dL (10-22 @ 06:17)  Hemoglobin: 16.1 g/dL (10-21 @ 06:23)    10-24    141  |  108  |  16  ----------------------------<  82  3.8   |  24  |  0.83    Ca    8.8      24 Oct 2020 06:34  Phos  2.8     10-23  Mg     2.1     10-24      Creatinine Trend: 0.83<--, 0.80<--, 0.91<--, 0.99<--             10-23-20 @ 07:01  -  10-24-20 @ 07:00  --------------------------------------------------------  IN: 240 mL / OUT: 0 mL / NET: 240 mL        PHYSICAL EXAM  Vital Signs Last 24 Hrs  T(C): 36.7 (24 Oct 2020 11:32), Max: 36.8 (23 Oct 2020 20:55)  T(F): 98.1 (24 Oct 2020 11:32), Max: 98.2 (23 Oct 2020 20:55)  HR: 74 (24 Oct 2020 11:32) (65 - 82)  BP: 122/79 (24 Oct 2020 11:32) (110/66 - 131/78)  BP(mean): --  RR: 18 (24 Oct 2020 11:32) (18 - 19)  SpO2: 96% (24 Oct 2020 11:32) (95% - 96%)      Gen: Appears well in NAD  HEENT:  (-)icterus (-)pallor  CV: N S1 S2 1/6 YEVGENIY (+)2 Pulses B/l  Resp:  Clear to auscultation B/L, normal effort  GI: (+) BS Soft, NT, ND  Lymph:  (-)Edema, (-)obvious lymphadenopathy  Skin: Warm to touch, Normal turgor  Psych: Appropriate mood and affect      TELEMETRY: NSR 60-90    ECG:  NSR w/ APC's.    < from: Transthoracic Echocardiogram (10.23.20 @ 19:23) >  Conclusions:  1. Increased relative wall thickness with normal left  ventricular mass index, consistent with concentric left  ventricular remodeling.  2. Endocardium not well visualized; grossly preserved  left  ventricular systolic function.  3. The right ventricle is not well visualized; grossly  normal right ventricular systolic function.  *** Compared with echocardiogram of 7/22/2013, no  significant changes noted.    < end of copied text >    ASSESSMENT/PLAN: 60y Female with PMH of HTN and Asthma who presents with insidious onset of shortness of breath and pre-syncope.  NYHA IIIB functional status, and is only comfortable at rest.  Has daily episodes of nausea and dry-heaving without vomiting.  Presented to hospital out of frustration for inability to take care of herself at home. Has seen an [facility] Cardiologist ~7 yrs ago. Was told she has Mitral Valve Prolapse, but is not aware of any heart failure or arrhythmia syndromes that go with it. States she had an echo and stress test many years ago. Has had no reason to return for cardiovascular care. No hx of MI, CHF, or arrhythmia.  Has orthopnea. Prefers to sleep in a recliner.  	  - MI ruled out, no sign of ACS  - Asthma diagnosis is in question.  Consider pulmonology consultation.  - Monitor on telemetry, replace K to 4, Mg to 2.  - Orthostatics improved this morning s/p IVF however patient still very symptomatic when standing, can add compression stockings  - Hold all antihypertensives at this time - monitor BP, can reintroduce as needed  - GI follow up noted  - TSH WNL, A1c 5.8 (prediabetic)  - Neurology follow up appreciated - CT head with chronic right cerebral hemisphere infarct, f/u CTA head/neck, trial of meclizine  - TTE noted above with normal LV/RV function, no sig valvular disease    Kleber Grace PA-C  Pager: [phone]

## 2023-11-14 NOTE — ED PROVIDER NOTE - ATTENDING CONTRIBUTION TO CARE
None Done
57 yof pmhx htn mild asthma, morbid obesity, presents after her doctors office called her about an abnormal lab result. pt states saw her doctor for a regular checkup. does report some fatigue and intermittent mild palpitations over last week. denies cp or sob, no change in urine output. has otherwise been feeling well.     ROS:   constitutional - no fever, no chills  eyes - no visual changes, no redness  eent - no sore throat, no nasal congestion  cvs - no chest pain, no leg swelling  resp - no shortness of breath, no cough  gi - no abdominal pain, no vomiting, no diarrhea  gu - no dysuria, no hematuria  msk - no acute back pain, no joint swelling  skin - no rashes, no jaundice  neuro - no headache, no focal weakness  psych - no acute mental health issue     Physical Exam:   constitutional - well appearing, awake and alert, oriented x3  head - no external evidence of trauma  cvs - rrr, no murmurs, no peripheral edema  resp - breath sounds clear and equal bilat  gi - abdomen soft and nontender, no rigidity, guarding or rebound, bowel sounds present  msk - moving all extremities spontaneously  neuro - alert and oriented x3, no focal deficits, CNs 2-12 grossly intact  skin- no jaundice, warm and dry  psych - mood and affect wnl, no apparent risk to self or others     pt w normal K value in ED, no ekg changes. lab result done at pmd's office likely spurious or hemolyzed. pt will f/u w pmd for ongoing eval. additional verbal instructions regarding diagnosis, return precautions and follow up plan given to pt and/or family. WARNER Toure MD

## 2025-01-27 ENCOUNTER — EMERGENCY (EMERGENCY)
Facility: HOSPITAL | Age: 65
LOS: 1 days | Discharge: HOME | End: 2025-01-27
Attending: STUDENT IN AN ORGANIZED HEALTH CARE EDUCATION/TRAINING PROGRAM
Payer: MEDICARE

## 2025-01-27 VITALS
RESPIRATION RATE: 20 BRPM | WEIGHT: 293 LBS | TEMPERATURE: 98 F | DIASTOLIC BLOOD PRESSURE: 103 MMHG | SYSTOLIC BLOOD PRESSURE: 194 MMHG | HEART RATE: 60 BPM | OXYGEN SATURATION: 97 % | HEIGHT: 68 IN

## 2025-01-27 VITALS
RESPIRATION RATE: 15 BRPM | TEMPERATURE: 98 F | SYSTOLIC BLOOD PRESSURE: 152 MMHG | OXYGEN SATURATION: 97 % | HEART RATE: 52 BPM | DIASTOLIC BLOOD PRESSURE: 69 MMHG

## 2025-01-27 LAB
ALBUMIN SERPL ELPH-MCNC: 3.5 G/DL — SIGNIFICANT CHANGE UP (ref 3.3–5)
ALP SERPL-CCNC: 101 U/L — SIGNIFICANT CHANGE UP (ref 40–120)
ALT FLD-CCNC: 20 U/L — SIGNIFICANT CHANGE UP (ref 10–45)
ANION GAP SERPL CALC-SCNC: 13 MMOL/L — SIGNIFICANT CHANGE UP (ref 5–17)
APPEARANCE UR: CLEAR — SIGNIFICANT CHANGE UP
AST SERPL-CCNC: 24 U/L — SIGNIFICANT CHANGE UP (ref 10–40)
BACTERIA # UR AUTO: NEGATIVE /HPF — SIGNIFICANT CHANGE UP
BASOPHILS # BLD AUTO: 0.02 K/UL — SIGNIFICANT CHANGE UP (ref 0–0.2)
BASOPHILS NFR BLD AUTO: 0.4 % — SIGNIFICANT CHANGE UP (ref 0–2)
BILIRUB SERPL-MCNC: 0.7 MG/DL — SIGNIFICANT CHANGE UP (ref 0.2–1.2)
BILIRUB UR-MCNC: NEGATIVE — SIGNIFICANT CHANGE UP
BUN SERPL-MCNC: 13 MG/DL — SIGNIFICANT CHANGE UP (ref 7–23)
CALCIUM SERPL-MCNC: 9.4 MG/DL — SIGNIFICANT CHANGE UP (ref 8.4–10.5)
CAST: 0 /LPF — SIGNIFICANT CHANGE UP (ref 0–4)
CHLORIDE SERPL-SCNC: 103 MMOL/L — SIGNIFICANT CHANGE UP (ref 96–108)
CO2 SERPL-SCNC: 23 MMOL/L — SIGNIFICANT CHANGE UP (ref 22–31)
COLOR SPEC: YELLOW — SIGNIFICANT CHANGE UP
CREAT SERPL-MCNC: 0.92 MG/DL — SIGNIFICANT CHANGE UP (ref 0.5–1.3)
DIFF PNL FLD: ABNORMAL
EGFR: 69 ML/MIN/1.73M2 — SIGNIFICANT CHANGE UP
EOSINOPHIL # BLD AUTO: 0.12 K/UL — SIGNIFICANT CHANGE UP (ref 0–0.5)
EOSINOPHIL NFR BLD AUTO: 2.1 % — SIGNIFICANT CHANGE UP (ref 0–6)
GAS PNL BLDV: SIGNIFICANT CHANGE UP
GLUCOSE SERPL-MCNC: 90 MG/DL — SIGNIFICANT CHANGE UP (ref 70–99)
GLUCOSE UR QL: NEGATIVE MG/DL — SIGNIFICANT CHANGE UP
HCT VFR BLD CALC: 51.6 % — HIGH (ref 34.5–45)
HGB BLD-MCNC: 15.6 G/DL — HIGH (ref 11.5–15.5)
IMM GRANULOCYTES NFR BLD AUTO: 0.4 % — SIGNIFICANT CHANGE UP (ref 0–0.9)
KETONES UR-MCNC: NEGATIVE MG/DL — SIGNIFICANT CHANGE UP
LEUKOCYTE ESTERASE UR-ACNC: NEGATIVE — SIGNIFICANT CHANGE UP
LIDOCAIN IGE QN: 16 U/L — SIGNIFICANT CHANGE UP (ref 7–60)
LYMPHOCYTES # BLD AUTO: 1.1 K/UL — SIGNIFICANT CHANGE UP (ref 1–3.3)
LYMPHOCYTES # BLD AUTO: 19.6 % — SIGNIFICANT CHANGE UP (ref 13–44)
MCHC RBC-ENTMCNC: 24.1 PG — LOW (ref 27–34)
MCHC RBC-ENTMCNC: 30.2 G/DL — LOW (ref 32–36)
MCV RBC AUTO: 79.9 FL — LOW (ref 80–100)
MONOCYTES # BLD AUTO: 0.69 K/UL — SIGNIFICANT CHANGE UP (ref 0–0.9)
MONOCYTES NFR BLD AUTO: 12.3 % — SIGNIFICANT CHANGE UP (ref 2–14)
NEUTROPHILS # BLD AUTO: 3.66 K/UL — SIGNIFICANT CHANGE UP (ref 1.8–7.4)
NEUTROPHILS NFR BLD AUTO: 65.2 % — SIGNIFICANT CHANGE UP (ref 43–77)
NITRITE UR-MCNC: NEGATIVE — SIGNIFICANT CHANGE UP
NRBC # BLD: 0 /100 WBCS — SIGNIFICANT CHANGE UP (ref 0–0)
PH UR: 5 — SIGNIFICANT CHANGE UP (ref 5–8)
PLATELET # BLD AUTO: 315 K/UL — SIGNIFICANT CHANGE UP (ref 150–400)
POTASSIUM SERPL-MCNC: 3.6 MMOL/L — SIGNIFICANT CHANGE UP (ref 3.5–5.3)
POTASSIUM SERPL-SCNC: 3.6 MMOL/L — SIGNIFICANT CHANGE UP (ref 3.5–5.3)
PROT SERPL-MCNC: 6.9 G/DL — SIGNIFICANT CHANGE UP (ref 6–8.3)
PROT UR-MCNC: NEGATIVE MG/DL — SIGNIFICANT CHANGE UP
RBC # BLD: 6.46 M/UL — HIGH (ref 3.8–5.2)
RBC # FLD: 15.6 % — HIGH (ref 10.3–14.5)
RBC CASTS # UR COMP ASSIST: 1 /HPF — SIGNIFICANT CHANGE UP (ref 0–4)
REVIEW: SIGNIFICANT CHANGE UP
SODIUM SERPL-SCNC: 139 MMOL/L — SIGNIFICANT CHANGE UP (ref 135–145)
SP GR SPEC: >1.03 — HIGH (ref 1–1.03)
SQUAMOUS # UR AUTO: 4 /HPF — SIGNIFICANT CHANGE UP (ref 0–5)
UROBILINOGEN FLD QL: 0.2 MG/DL — SIGNIFICANT CHANGE UP (ref 0.2–1)
WBC # BLD: 5.61 K/UL — SIGNIFICANT CHANGE UP (ref 3.8–10.5)
WBC # FLD AUTO: 5.61 K/UL — SIGNIFICANT CHANGE UP (ref 3.8–10.5)
WBC UR QL: 3 /HPF — SIGNIFICANT CHANGE UP (ref 0–5)

## 2025-01-27 PROCEDURE — 83690 ASSAY OF LIPASE: CPT

## 2025-01-27 PROCEDURE — 87077 CULTURE AEROBIC IDENTIFY: CPT

## 2025-01-27 PROCEDURE — 85025 COMPLETE CBC W/AUTO DIFF WBC: CPT

## 2025-01-27 PROCEDURE — 83605 ASSAY OF LACTIC ACID: CPT

## 2025-01-27 PROCEDURE — 93971 EXTREMITY STUDY: CPT | Mod: 26,LT

## 2025-01-27 PROCEDURE — 76705 ECHO EXAM OF ABDOMEN: CPT

## 2025-01-27 PROCEDURE — 85018 HEMOGLOBIN: CPT

## 2025-01-27 PROCEDURE — 82947 ASSAY GLUCOSE BLOOD QUANT: CPT

## 2025-01-27 PROCEDURE — 82435 ASSAY OF BLOOD CHLORIDE: CPT

## 2025-01-27 PROCEDURE — 84295 ASSAY OF SERUM SODIUM: CPT

## 2025-01-27 PROCEDURE — 81001 URINALYSIS AUTO W/SCOPE: CPT

## 2025-01-27 PROCEDURE — 80053 COMPREHEN METABOLIC PANEL: CPT

## 2025-01-27 PROCEDURE — 99285 EMERGENCY DEPT VISIT HI MDM: CPT | Mod: GC

## 2025-01-27 PROCEDURE — 74177 CT ABD & PELVIS W/CONTRAST: CPT | Mod: 26

## 2025-01-27 PROCEDURE — 82330 ASSAY OF CALCIUM: CPT

## 2025-01-27 PROCEDURE — 36000 PLACE NEEDLE IN VEIN: CPT | Mod: XU

## 2025-01-27 PROCEDURE — 93971 EXTREMITY STUDY: CPT

## 2025-01-27 PROCEDURE — 82803 BLOOD GASES ANY COMBINATION: CPT

## 2025-01-27 PROCEDURE — 99284 EMERGENCY DEPT VISIT MOD MDM: CPT | Mod: 25

## 2025-01-27 PROCEDURE — 87086 URINE CULTURE/COLONY COUNT: CPT

## 2025-01-27 PROCEDURE — 85014 HEMATOCRIT: CPT

## 2025-01-27 PROCEDURE — 76705 ECHO EXAM OF ABDOMEN: CPT | Mod: 26

## 2025-01-27 PROCEDURE — 84132 ASSAY OF SERUM POTASSIUM: CPT

## 2025-01-27 PROCEDURE — 74177 CT ABD & PELVIS W/CONTRAST: CPT | Mod: MC

## 2025-01-27 RX ORDER — SODIUM CHLORIDE 9 MG/ML
1000 INJECTION, SOLUTION INTRAVENOUS ONCE
Refills: 0 | Status: COMPLETED | OUTPATIENT
Start: 2025-01-27 | End: 2025-01-27

## 2025-01-27 RX ADMIN — SODIUM CHLORIDE 1000 MILLILITER(S): 9 INJECTION, SOLUTION INTRAVENOUS at 04:34

## 2025-01-27 NOTE — ED ADULT NURSE NOTE - OBJECTIVE STATEMENT
pt 64y F, pmhx HTN, HLD, DVT on eliquis, c/o left leg pain, redness, NVD w/ diffuse abdominal pain for 1 week, pt tolerating PO, frequent watery stools, no urinary symptoms, NBNB vomiting, pt diffuse abdominal pain, abdomen distended but normal per pt, pt ambulatory, no redness noted on leg, pt A&Ox4, comfort and safety secured

## 2025-01-27 NOTE — ED ADULT TRIAGE NOTE - CHIEF COMPLAINT QUOTE
pt c/o L leg soreness and warm sensation "like blood is dripping down my leg w/ line across my leg" hx. blood clots  pt also c/o of x5 days diarrhea, soreness and cramping of L side and weakness   on Eliquis

## 2025-01-27 NOTE — ED PROVIDER NOTE - OBJECTIVE STATEMENT
63 y/o F with PMH syncope, obesity, asthma, TIA, HTN, pre-diabetes presenting with 5 days of diarrhea and nausea/vomiting. She had 4 episodes of watery, non-bloody diarrhea today. Notes some crampy lower abdominal pain. Patient also reporting change in sensation of LLE and potential increased swelling. She is concerned she may have a DVT in LLE. Patient previously had a DVT in RLE and has been anticoagulated for the last 6 months with eliquis. She denies fevers, chills, shortness of breath, or other symptoms.

## 2025-01-27 NOTE — ED PROVIDER NOTE - PATIENT PORTAL LINK FT
You can access the FollowMyHealth Patient Portal offered by [facility] by registering at the following website: [email or web address]. By joining [email or web address]’s FollowMyHealth portal, you will also be able to view your health information using other applications (apps) compatible with our system.

## 2025-01-27 NOTE — ED PROVIDER NOTE - PHYSICAL EXAMINATION
GENERAL: NAD, lying in bed comfortably  HEAD:  Atraumatic, normocephalic  EYES: EOMI,  conjunctiva and sclera clear  NECK: Supple, trachea midline, no JVD  HEART: Regular rate and rhythm, no murmurs, rubs, or gallops  LUNGS: Unlabored respirations.  Clear to auscultation bilaterally, no crackles, wheezing, or rhonchi  ABDOMEN: Soft, nontender, nondistended  EXTREMITIES: No LE edema  NERVOUS SYSTEM:  A&Ox3, moving all extremities, no focal deficits   SKIN: No rashes or lesions

## 2025-01-27 NOTE — ED ADULT NURSE REASSESSMENT NOTE - NS ED NURSE REASSESS COMMENT FT1
Report received from [facility] coverage RN Rj. Patient currently resting comfortably w/ no complaints or requests at this time. Plan of care explained. Stretcher in lowest position with call bell within reach and side rails up. Comfort and safety provided.

## 2025-01-27 NOTE — ED PROVIDER NOTE - CLINICAL SUMMARY MEDICAL DECISION MAKING FREE TEXT BOX
63 y/o F with PMH syncope, obesity, asthma, TIA, HTN, pre-diabetes presenting with 5 days of diarrhea and nausea/vomiting. Suspect C.diff given hx of recent antibiotic use vs viral gastroenteritis. Will obtain CT A/P given crampy LLQ abdominal pain and hx of diverticulitis. Patient also noting increased LLE swelling so will order LLE duplex.

## 2025-01-27 NOTE — ED PROVIDER NOTE - NSFOLLOWUPINSTRUCTIONS_ED_ALL_ED_FT
Gallstones are stones made of cholesterol and other substances that form in the gallbladder. The gallbladder stores bile. Bile helps the body digest food. Gallstones also can form in the bile duct. This is the tube that carries bile from the gallbladder and the liver to the small intestine.    Gallstones that block the gallbladder from emptying or get stuck in the bile duct can cause pain and infection.    Sometimes a thick material called "sludge" forms instead of stones. This can cause the same problems as gallstones.    The doctor may have given you medicine for pain. You may need follow-up appointments for more testing and treatment. If you continue to have problems, you may need surgery to remove your gallbladder.    The doctor has checked you carefully, but problems can develop later. If you notice any problems or new symptoms, get medical treatment right away .    Follow-up care is a key part of your treatment and safety. Be sure to make and go to all appointments, and call your doctor if you are having problems. It's also a good idea to know your test results and keep a list of the medicines you take.    How can you care for yourself at home?  Rest until you feel better.  Be safe with medicines. Read and follow all instructions on the label.  If the doctor gave you a prescription medicine for pain, take it as prescribed.  If you are not taking a prescription pain medicine, ask your doctor if you can take an over-the-counter medicine.  Avoid foods that cause symptoms, especially fatty foods. These can make the gallbladder tighten and cause pain.  When should you call for help?  Call your doctor now or seek immediate medical care if:    You are vomiting.  You have new or worse belly pain.  You have a fever.  You cannot pass stools or gas.

## 2025-01-27 NOTE — ED PROVIDER NOTE - PROGRESS NOTE DETAILS
Roselia Grady - On reassessment patient has tenderness to palpation of the right upper quadrant and reports a history of cholelithiasis last ultrasound a year ago.  Will obtain ultrasound and reassess.  Overall patient reports her nausea vomiting is improving denies experiencing hunger. Dr. Be: Received signout from Dr. Bazan.  64-year-old female history of right lower extremity DVT on Eliquis presenting with left lower extremity pain as well as abdominal pain, more on the right side now, diarrhea.  Patient was on amoxicillin 3 weeks ago.  Patient has not had a bowel movement while in the ED.  Awaiting C. difficile results, CT abdomen pelvis.  Patient seen at bedside, duplex results reviewed, patient is well-appearing, no acute distress, minimal to moderate tenderness to palpation right lower quadrant.  Awaiting CT abdomen pelvis results. Roselia Grady - On reevaluation patient continues to have diffuse abdominal tenderness and positive Moss sign.  Ultrasound consistent with cholelithiasis no cholecystitis however noted limited by body habitus.  Will consult general surgery. spoke with [facility] resident who will come eval the patient spoke with surgery resident who will come eval the patient surgery came to bedside and suggested HIDA scan, pt declined styaing in the hospital and agrees to f/u outpatient with surgery, understands risks.   - pending UA

## 2025-01-27 NOTE — ED ADULT NURSE REASSESSMENT NOTE - NS ED NURSE REASSESS COMMENT FT1
Received report from SANDEEP Keenan. VSS. Patient changed into a hospital gown. Awaiting a stool sample and CT.

## 2025-01-27 NOTE — CONSULT NOTE ADULT - SUBJECTIVE AND OBJECTIVE BOX
GENERAL SURGERY CONSULT NOTE  --------------------------------------------------------------------------------------------    HPI:  Patient is a 64yF PMH syncope, obesity, asthma, TIA, HTN, pre-diabetes presenting with 5 days of abd pain, diarrhea, nausea/vomiting. She had 4 episodes of watery, non-bloody diarrhea today.  Pain not associated with food. Has been previously told she had gallstones. Never followed up for surgical intervention.     In ED, [facility]. Labs unremarkable. CT unremarkable. U/S showing cholelithiasis but no signs of acute cholecysitits.     Denies fever, chills, SOB, CP.       PMH/PSH:  PAST MEDICAL & SURGICAL HISTORY:  HTN (hypertension)    Vertigo    Asthma    MVP (mitral valve prolapse)    Uterine fibroid    S/P  section        FAMILY HISTORY:  [] Family history not pertinent as reviewed with the patient and family    SOCIAL HISTORY:        ALLERGIES:   lisinopril (Hives)      HOME MEDICATIONS:       CURRENT MEDICATIONS  MEDICATIONS (STANDING):   MEDICATIONS (PRN):  --------------------------------------------------------------------------------------------    Vitals:   T(C): 36.8 (25 @ 07:12), Max: 36.8 (25 @ 07:12)  HR: 57 (25 @ 07:12) (57 - 60)  BP: 155/74 (25 @ 07:12) (155/74 - 194/103)  RR: 16 (25 @ 07:12) (16 - 20)  SpO2: 98% (25 @ 07:12) (97% - 98%)  CAPILLARY BLOOD GLUCOSE          Height (cm): 172.7 ( @ 03:36)  Weight (kg): 145.1 ( @ 03:36)  BMI (kg/m2): 48.6 ( 03:36)  BSA (m2): 2.5 ( @ 03:36)      PHYSICAL EXAM:  General: NAD  Cardio: RRR  Resp: Good effort, nonlabored breathing on room air  GI/Abd: Soft, obese, diffuse mild ttp, no rebound/guarding, no masses palpated  --------------------------------------------------------------------------------------------    LABS  CBC ( @ 04:34)                              15.6[H]                         5.61    )----------------(  315        65.2  % Neutrophils, 19.6  % Lymphocytes, ANC: 3.66                                51.6[H]    BMP ( @ 04:34)             139     |  103     |  13    		Ca++ --      Ca 9.4                ---------------------------------( 90    		Mg --                 3.6     |  23      |  0.92  			Ph --        LFTs ( @ 04:34)      TPro 6.9 / Alb 3.5 / TBili 0.7 / DBili -- / AST 24 / ALT 20 / AlkPhos 101          VBG ( @ 04:33)     7.35 / 55[H] / 27 / 30[H] / 3.1[H] / 42.8[L]%     Lactate: 1.3    --------------------------------------------------------------------------------------------    MICROBIOLOGY  Urinalysis ( @ 04:34):     Color:  / Appearance:  / SG:  / pH:  / Gluc: 90 / Ketones:  / Bili:  / Urobili:  / Protein : / Nitrites:  / Leuk.Est:  / RBC:  / WBC:  / Sq Epi:  / Non Sq Epi:  / Bacteria          --------------------------------------------------------------------------------------------    IMAGING  < from: US Abdomen Upper Quadrant Right (25 @ 09:20) >    IMPRESSION:  Limited study secondary to patient body habitus.  Cholelithiasis without sonographic evidence of acute cholecystitis.  Focal areas of hepatic steatosis.    < end of copied text >  < from: CT Abdomen and Pelvis w/ IV Cont (25 @ 07:48) >  FINDINGS:  LOWER CHEST: Mild cardiomegaly.    LIVER: Hepatomegaly.  BILE DUCTS: Normal caliber.  GALLBLADDER: Within normal limits.  SPLEEN: Within normal limits.  PANCREAS: Mild fatty replacement.  ADRENALS: Within normal limits.  KIDNEYS/URETERS: No renal stones or hydronephrosis. Renal cysts.    BLADDER: Partially distended.  REPRODUCTIVE ORGANS: Bulky anteverted leiomyomatous uterus.    BOWEL: No bowel obstruction. Colonic diverticulosis. Appendix is normal.  PERITONEUM/RETROPERITONEUM: Within normal limits.  VESSELS: Within normal limits.  LYMPH NODES: No lymphadenopathy.  ABDOMINAL WALL: Postsurgical changes.  BONES: Degenerative changes.    IMPRESSION:  No acute intra-abdominal pelvic pathology.    < end of copied text >      --------------------------------------------------------------------------------------------

## 2025-01-27 NOTE — CONSULT NOTE ADULT - ASSESSMENT
ASSESSMENT: Patient is a 64F PMH PMH syncope, obesity, asthma, TIA, HTN, pre-diabetes presenting with 5 days of abd pain, diarrhea, nausea/vomiting. Surgery consulted to r/o symptomatic cholelithiasis. Pain not consistent with cholelithiasis. No signs of acute cholecystitis.    PLAN:    - no acute surgical intervention  - rec GI PCR  - rec HIDA but patient stated she did not want to stay for further eval     - can f/u outpatient for further eval  - can follow up with Dr. Elliott outpatient for elective cholecystectomy planning    Discussed with attending Dr. Elliott  [facility]  [phone]

## 2025-01-27 NOTE — ED PROVIDER NOTE - ATTENDING CONTRIBUTION TO CARE
64F here for few days of multiple episodes of watery nonbloody diarrhea with llq abdominal cramping pain, nonrad, no mod factors. Predom presentation today after noticed an indent on left thigh skin that has since gone away, concerned for DVT. Hx c/b DVT on eliquis with reported compliance, diverticulosis with diverticulitis, asthma, TIA, HTN, preDM. Denies fever, chills. Did get amoxicillin 3 weeks prior for unclear infx    Hemodynamically stable. NAD. AAOx4 (person, place, time, event). PERRL 3mm, EOMI w/o nystagmus, well hydrated, RRR, no audible cardiac murmurs. clear lungs, no inc work of breathing. benign abdomen, - ndiaye, no peritoneal signs, no cva ttp. no visible rashes nor deformities, no signs of jaundice, fluid overload, nor anemia. symm calves, no edema. full str/rom/neurovasc all 4 extrem. Steady gait. anxious.    Acute diarrheal illness w/o e/o acute bowel pathology on exam (but patient requesting CT) and patient concern about left leg indent that has since resolved, w/o e/o DVT (but requesting [facility] duplex). R/o c.diff given recent pcn abxs. Labs, stool culture, gi pcr. Summary of presentation, physical exam findings, plan, expected turn around time for diagnostics discussed with patient. Agrees.

## 2025-01-28 LAB
CULTURE RESULTS: ABNORMAL
SPECIMEN SOURCE: SIGNIFICANT CHANGE UP

## 2025-04-18 NOTE — ED ADULT NURSE NOTE - TIMING
Detail Level: Detailed Quality 130: Documentation Of Current Medications In The Medical Record: Current Medications Documented Quality 226: Preventive Care And Screening: Tobacco Use: Screening And Cessation Intervention: Patient screened for tobacco use and is an ex/non-smoker unknown

## 2025-08-01 ENCOUNTER — NON-APPOINTMENT (OUTPATIENT)
Age: 65
End: 2025-08-01